# Patient Record
Sex: MALE | Race: BLACK OR AFRICAN AMERICAN | Employment: FULL TIME | URBAN - METROPOLITAN AREA
[De-identification: names, ages, dates, MRNs, and addresses within clinical notes are randomized per-mention and may not be internally consistent; named-entity substitution may affect disease eponyms.]

---

## 2017-09-27 ENCOUNTER — OFFICE VISIT (OUTPATIENT)
Dept: FAMILY MEDICINE CLINIC | Facility: CLINIC | Age: 42
End: 2017-09-27

## 2017-09-27 ENCOUNTER — APPOINTMENT (OUTPATIENT)
Dept: LAB | Age: 42
End: 2017-09-27
Attending: FAMILY MEDICINE
Payer: COMMERCIAL

## 2017-09-27 VITALS
HEART RATE: 101 BPM | WEIGHT: 270 LBS | HEIGHT: 75 IN | DIASTOLIC BLOOD PRESSURE: 94 MMHG | BODY MASS INDEX: 33.57 KG/M2 | SYSTOLIC BLOOD PRESSURE: 144 MMHG | TEMPERATURE: 98 F

## 2017-09-27 DIAGNOSIS — Z00.00 WELL ADULT EXAM: Primary | ICD-10-CM

## 2017-09-27 DIAGNOSIS — IMO0001 UNCONTROLLED TYPE 2 DIABETES MELLITUS WITHOUT COMPLICATION, WITH LONG-TERM CURRENT USE OF INSULIN: ICD-10-CM

## 2017-09-27 DIAGNOSIS — E66.9 OBESITY (BMI 30.0-34.9): ICD-10-CM

## 2017-09-27 DIAGNOSIS — B35.3 TINEA PEDIS OF BOTH FEET: ICD-10-CM

## 2017-09-27 DIAGNOSIS — N52.8 OTHER MALE ERECTILE DYSFUNCTION: ICD-10-CM

## 2017-09-27 DIAGNOSIS — I10 ESSENTIAL HYPERTENSION: ICD-10-CM

## 2017-09-27 DIAGNOSIS — Z23 NEED FOR INFLUENZA VACCINATION: ICD-10-CM

## 2017-09-27 PROCEDURE — 90471 IMMUNIZATION ADMIN: CPT | Performed by: FAMILY MEDICINE

## 2017-09-27 PROCEDURE — 93010 ELECTROCARDIOGRAM REPORT: CPT | Performed by: FAMILY MEDICINE

## 2017-09-27 PROCEDURE — 90686 IIV4 VACC NO PRSV 0.5 ML IM: CPT | Performed by: FAMILY MEDICINE

## 2017-09-27 PROCEDURE — 93005 ELECTROCARDIOGRAM TRACING: CPT

## 2017-09-27 PROCEDURE — 99396 PREV VISIT EST AGE 40-64: CPT | Performed by: FAMILY MEDICINE

## 2017-09-27 RX ORDER — LISINOPRIL 10 MG/1
10 TABLET ORAL DAILY
Qty: 90 TABLET | Refills: 0 | Status: SHIPPED | OUTPATIENT
Start: 2017-09-27

## 2017-09-27 RX ORDER — CLOTRIMAZOLE 1 %
1 CREAM (GRAM) TOPICAL 2 TIMES DAILY
Qty: 1 TUBE | Refills: 0 | Status: ON HOLD | OUTPATIENT
Start: 2017-09-27 | End: 2020-06-30 | Stop reason: ALTCHOICE
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
## 2017-09-27 NOTE — PATIENT INSTRUCTIONS
Athlete’s Foot    Athlete’s foot (tinea pedis) is caused by a fungal infection in the skin. It affects the skin between the toes, causing cracks in the skin called fissures.  It can also affect the bottom of the foot where it causes dry white scales and p · Increasing redness or swelling of the foot  · Infection comes back soon after treatment  · Pus draining from cracks in the skin  Date Last Reviewed: 8/1/2016  © 5997-9921 The [address] [place] [address], [address] [place] [address] [place].  All rights re

## 2017-09-28 ENCOUNTER — TELEPHONE (OUTPATIENT)
Dept: FAMILY MEDICINE CLINIC | Facility: CLINIC | Age: 42
End: 2017-09-28

## 2017-09-28 NOTE — TELEPHONE ENCOUNTER
----- Message from Kala Tinsley MD sent at 9/28/2017 12:45 PM CDT -----  EKG WAS NORMAL  PLS INFORM PATIENT

## 2019-01-12 ENCOUNTER — APPOINTMENT (OUTPATIENT)
Dept: GENERAL RADIOLOGY | Facility: HOSPITAL | Age: 44
DRG: 638 | End: 2019-01-12
Attending: EMERGENCY MEDICINE
Payer: COMMERCIAL

## 2019-01-12 ENCOUNTER — HOSPITAL ENCOUNTER (INPATIENT)
Facility: HOSPITAL | Age: 44
LOS: 3 days | Discharge: HOME HEALTH | DRG: 638 | End: 2019-01-15
Attending: EMERGENCY MEDICINE | Admitting: HOSPITALIST
Payer: COMMERCIAL

## 2019-01-12 DIAGNOSIS — L03.119 CELLULITIS IN DIABETIC FOOT (HCC): Primary | ICD-10-CM

## 2019-01-12 DIAGNOSIS — L97.929 NON-HEALING ULCER OF LOWER EXTREMITY, LEFT, WITH UNSPECIFIED SEVERITY (HCC): ICD-10-CM

## 2019-01-12 DIAGNOSIS — E11.628 CELLULITIS IN DIABETIC FOOT (HCC): Primary | ICD-10-CM

## 2019-01-12 LAB
ACETONE SERPL QL: NEGATIVE
ANION GAP SERPL CALC-SCNC: 10 MMOL/L (ref 0–18)
BACTERIA UR QL AUTO: NEGATIVE /HPF
BASOPHILS # BLD: 0 K/UL (ref 0–0.2)
BASOPHILS NFR BLD: 1 %
BILIRUB UR QL: NEGATIVE
BUN SERPL-MCNC: 8 MG/DL (ref 8–20)
BUN/CREAT SERPL: 8.2 (ref 10–20)
CALCIUM SERPL-MCNC: 9.3 MG/DL (ref 8.5–10.5)
CHLORIDE SERPL-SCNC: 101 MMOL/L (ref 95–110)
CLARITY UR: CLEAR
CO2 SERPL-SCNC: 27 MMOL/L (ref 22–32)
COLOR UR: YELLOW
CREAT SERPL-MCNC: 0.97 MG/DL (ref 0.5–1.5)
EOSINOPHIL # BLD: 0.3 K/UL (ref 0–0.7)
EOSINOPHIL NFR BLD: 5 %
ERYTHROCYTE [DISTWIDTH] IN BLOOD BY AUTOMATED COUNT: 13 % (ref 11–15)
EST. AVERAGE GLUCOSE BLD GHB EST-MCNC: 151 MG/DL (ref 68–126)
GLUCOSE BLDC GLUCOMTR-MCNC: 153 MG/DL (ref 70–99)
GLUCOSE BLDC GLUCOMTR-MCNC: 157 MG/DL (ref 70–99)
GLUCOSE BLDC GLUCOMTR-MCNC: 210 MG/DL (ref 70–99)
GLUCOSE BLDC GLUCOMTR-MCNC: 227 MG/DL (ref 70–99)
GLUCOSE BLDC GLUCOMTR-MCNC: 69 MG/DL (ref 70–99)
GLUCOSE BLDC GLUCOMTR-MCNC: 88 MG/DL (ref 70–99)
GLUCOSE SERPL-MCNC: 210 MG/DL (ref 70–99)
GLUCOSE UR-MCNC: 50 MG/DL
HBA1C MFR BLD HPLC: 6.9 % (ref ?–5.7)
HCT VFR BLD AUTO: 42 % (ref 41–52)
HGB BLD-MCNC: 14.7 G/DL (ref 13.5–17.5)
HGB UR QL STRIP.AUTO: NEGATIVE
KETONES UR-MCNC: NEGATIVE MG/DL
LEUKOCYTE ESTERASE UR QL STRIP.AUTO: NEGATIVE
LYMPHOCYTES # BLD: 2 K/UL (ref 1–4)
LYMPHOCYTES NFR BLD: 37 %
MCH RBC QN AUTO: 29.4 PG (ref 27–32)
MCHC RBC AUTO-ENTMCNC: 34.9 G/DL (ref 32–37)
MCV RBC AUTO: 84.2 FL (ref 80–100)
MONOCYTES # BLD: 0.7 K/UL (ref 0–1)
MONOCYTES NFR BLD: 13 %
NEUTROPHILS # BLD AUTO: 2.5 K/UL (ref 1.8–7.7)
NEUTROPHILS NFR BLD: 45 %
NITRITE UR QL STRIP.AUTO: NEGATIVE
OSMOLALITY UR CALC.SUM OF ELEC: 291 MOSM/KG (ref 275–295)
PH UR: 6 [PH] (ref 5–8)
PLATELET # BLD AUTO: 218 K/UL (ref 140–400)
PMV BLD AUTO: 8 FL (ref 7.4–10.3)
POTASSIUM SERPL-SCNC: 3.2 MMOL/L (ref 3.3–5.1)
POTASSIUM SERPL-SCNC: 4 MMOL/L (ref 3.3–5.1)
PROT UR-MCNC: NEGATIVE MG/DL
RBC # BLD AUTO: 4.98 M/UL (ref 4.5–5.9)
RBC #/AREA URNS AUTO: 1 /HPF
SODIUM SERPL-SCNC: 138 MMOL/L (ref 136–144)
SP GR UR STRIP: 1.02 (ref 1–1.03)
UROBILINOGEN UR STRIP-ACNC: <2
VIT C UR-MCNC: NEGATIVE MG/DL
WBC # BLD AUTO: 5.5 K/UL (ref 4–11)
WBC #/AREA URNS AUTO: 1 /HPF

## 2019-01-12 PROCEDURE — 73630 X-RAY EXAM OF FOOT: CPT | Performed by: EMERGENCY MEDICINE

## 2019-01-12 PROCEDURE — 99223 1ST HOSP IP/OBS HIGH 75: CPT | Performed by: HOSPITALIST

## 2019-01-12 RX ORDER — ACETAMINOPHEN 325 MG/1
650 TABLET ORAL EVERY 4 HOURS PRN
Status: DISCONTINUED | OUTPATIENT
Start: 2019-01-12 | End: 2019-01-15

## 2019-01-12 RX ORDER — LISINOPRIL 10 MG/1
10 TABLET ORAL DAILY
Status: DISCONTINUED | OUTPATIENT
Start: 2019-01-12 | End: 2019-01-15

## 2019-01-12 RX ORDER — HEPARIN SODIUM 5000 [USP'U]/ML
5000 INJECTION, SOLUTION INTRAVENOUS; SUBCUTANEOUS EVERY 8 HOURS SCHEDULED
Status: DISCONTINUED | OUTPATIENT
Start: 2019-01-12 | End: 2019-01-15

## 2019-01-12 RX ORDER — ONDANSETRON 2 MG/ML
4 INJECTION INTRAMUSCULAR; INTRAVENOUS EVERY 6 HOURS PRN
Status: DISCONTINUED | OUTPATIENT
Start: 2019-01-12 | End: 2019-01-15

## 2019-01-12 RX ORDER — SODIUM CHLORIDE 9 MG/ML
INJECTION, SOLUTION INTRAVENOUS CONTINUOUS
Status: DISCONTINUED | OUTPATIENT
Start: 2019-01-12 | End: 2019-01-14

## 2019-01-12 RX ORDER — METOCLOPRAMIDE HYDROCHLORIDE 5 MG/ML
10 INJECTION INTRAMUSCULAR; INTRAVENOUS EVERY 8 HOURS PRN
Status: DISCONTINUED | OUTPATIENT
Start: 2019-01-12 | End: 2019-01-15

## 2019-01-12 RX ORDER — ZOLPIDEM TARTRATE 5 MG/1
5 TABLET ORAL NIGHTLY PRN
Status: DISCONTINUED | OUTPATIENT
Start: 2019-01-12 | End: 2019-01-15

## 2019-01-12 RX ORDER — POTASSIUM CHLORIDE 20 MEQ/1
40 TABLET, EXTENDED RELEASE ORAL EVERY 4 HOURS
Status: COMPLETED | OUTPATIENT
Start: 2019-01-12 | End: 2019-01-12

## 2019-01-12 RX ORDER — HYDROCODONE BITARTRATE AND ACETAMINOPHEN 5; 325 MG/1; MG/1
2 TABLET ORAL EVERY 4 HOURS PRN
Status: DISCONTINUED | OUTPATIENT
Start: 2019-01-12 | End: 2019-01-15

## 2019-01-12 RX ORDER — HYDROCODONE BITARTRATE AND ACETAMINOPHEN 5; 325 MG/1; MG/1
1 TABLET ORAL EVERY 4 HOURS PRN
Status: DISCONTINUED | OUTPATIENT
Start: 2019-01-12 | End: 2019-01-15

## 2019-01-12 RX ORDER — DEXTROSE MONOHYDRATE 25 G/50ML
50 INJECTION, SOLUTION INTRAVENOUS AS NEEDED
Status: DISCONTINUED | OUTPATIENT
Start: 2019-01-12 | End: 2019-01-15

## 2019-01-12 RX ORDER — SODIUM CHLORIDE 0.9 % (FLUSH) 0.9 %
3 SYRINGE (ML) INJECTION AS NEEDED
Status: DISCONTINUED | OUTPATIENT
Start: 2019-01-12 | End: 2019-01-15

## 2019-01-12 RX ORDER — ACETAMINOPHEN 325 MG/1
650 TABLET ORAL EVERY 6 HOURS PRN
Status: DISCONTINUED | OUTPATIENT
Start: 2019-01-12 | End: 2019-01-15

## 2019-01-12 RX ORDER — LORAZEPAM 2 MG/ML
0.5 INJECTION INTRAMUSCULAR EVERY 6 HOURS PRN
Status: DISCONTINUED | OUTPATIENT
Start: 2019-01-12 | End: 2019-01-15

## 2019-01-12 RX ORDER — SODIUM PHOSPHATE, DIBASIC AND SODIUM PHOSPHATE, MONOBASIC 7; 19 G/133ML; G/133ML
1 ENEMA RECTAL ONCE AS NEEDED
Status: DISCONTINUED | OUTPATIENT
Start: 2019-01-12 | End: 2019-01-15

## 2019-01-12 RX ORDER — POLYETHYLENE GLYCOL 3350 17 G/17G
17 POWDER, FOR SOLUTION ORAL DAILY PRN
Status: DISCONTINUED | OUTPATIENT
Start: 2019-01-12 | End: 2019-01-15

## 2019-01-12 RX ORDER — DOCUSATE SODIUM 100 MG/1
100 CAPSULE, LIQUID FILLED ORAL 2 TIMES DAILY
Status: DISCONTINUED | OUTPATIENT
Start: 2019-01-12 | End: 2019-01-15

## 2019-01-12 RX ORDER — BISACODYL 10 MG
10 SUPPOSITORY, RECTAL RECTAL
Status: DISCONTINUED | OUTPATIENT
Start: 2019-01-12 | End: 2019-01-15

## 2019-01-12 NOTE — CONSULTS
[facility]    Report of Consultation    Candance Sport Patient Status:  Inpatient    1975 MRN [id number]   Location [facility] 1W Attending Karina Brice   Hosp Day # 0 PCP Ling Harris MD     Date of Admissio mL, Intravenous, PRN  •  0.9%  NaCl infusion, , Intravenous, Continuous  •  Heparin Sodium (Porcine) 5000 UNIT/ML injection 5,000 Units, 5,000 Units, Subcutaneous, Q8H [address]  •  acetaminophen (TYLENOL) tab 650 mg, 650 mg, Oral, Q6H PRN  •  acetaminophen (TYL cellulitis and swelling. His hallux nails are absent bilateral.  Other toenails are thickened dystrophic he says they fall off from time to time to.   There is an ulceration at the plantar distal tip of the left hallux there is erythema edema peeling of th erythema and edema of the toe will rule out osteomyelitis with an MRI with and without contrast.  The patient does not require vascular studies he has good capillary return and perfusion to the toes and in addition to that very easily palpable pulses both
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
## 2019-01-12 NOTE — ED NOTES
44yo M arrives to ER with c/o left big toe swelling and drainage. Patient also c/o swelling to left ankle and foot. Denies fevers. Patient with large wound to R toe. Patient states he has sensation to foot and toe. Denies pain.  States he noticed wound and

## 2019-01-12 NOTE — ED PROVIDER NOTES
Xr Foot, Complete (min 3 Views), Left (cpt=73630)    Result Date: 1/12/2019  CONCLUSION:  1. No fracture or destructive process. 2. Mild degenerative changes at the first metatarsal head. 3. Minimal posterior calcaneal spur.  4. Soft swelling first digit wi

## 2019-01-12 NOTE — ED PROVIDER NOTES
Patient Seen in: [facility] AND [facility] Emergency Department    History   Patient presents with:   Ankle Pain    Stated Complaint: left ankle swollen    HPI    38 yo male with h/o diabetes presents with one week of increased pain, swelling to  The right great sounds normal.   Abdominal: Soft. Bowel sounds are normal. He exhibits no distension and no mass. There is no tenderness. There is no rebound and no guarding. Musculoskeletal:   Left foot: the great toe is tender and swollen extending to the midfoot.  The

## 2019-01-13 ENCOUNTER — APPOINTMENT (OUTPATIENT)
Dept: MRI IMAGING | Facility: HOSPITAL | Age: 44
DRG: 638 | End: 2019-01-13
Attending: PODIATRIST
Payer: COMMERCIAL

## 2019-01-13 LAB
ALBUMIN SERPL BCP-MCNC: 3.2 G/DL (ref 3.5–4.8)
ALBUMIN/GLOB SERPL: 1 {RATIO} (ref 1–2)
ALP SERPL-CCNC: 62 U/L (ref 32–100)
ALT SERPL-CCNC: 15 U/L (ref 17–63)
ANION GAP SERPL CALC-SCNC: 8 MMOL/L (ref 0–18)
AST SERPL-CCNC: 15 U/L (ref 15–41)
BASOPHILS # BLD: 0 K/UL (ref 0–0.2)
BASOPHILS NFR BLD: 1 %
BILIRUB SERPL-MCNC: 0.5 MG/DL (ref 0.3–1.2)
BUN SERPL-MCNC: 6 MG/DL (ref 8–20)
BUN/CREAT SERPL: 5.6 (ref 10–20)
CALCIUM SERPL-MCNC: 9.1 MG/DL (ref 8.5–10.5)
CHLORIDE SERPL-SCNC: 106 MMOL/L (ref 95–110)
CO2 SERPL-SCNC: 25 MMOL/L (ref 22–32)
CREAT SERPL-MCNC: 1.08 MG/DL (ref 0.5–1.5)
EOSINOPHIL # BLD: 0.3 K/UL (ref 0–0.7)
EOSINOPHIL NFR BLD: 4 %
ERYTHROCYTE [DISTWIDTH] IN BLOOD BY AUTOMATED COUNT: 13.1 % (ref 11–15)
GLOBULIN PLAS-MCNC: 3.3 G/DL (ref 2.5–3.7)
GLUCOSE BLDC GLUCOMTR-MCNC: 138 MG/DL (ref 70–99)
GLUCOSE BLDC GLUCOMTR-MCNC: 155 MG/DL (ref 70–99)
GLUCOSE BLDC GLUCOMTR-MCNC: 166 MG/DL (ref 70–99)
GLUCOSE BLDC GLUCOMTR-MCNC: 181 MG/DL (ref 70–99)
GLUCOSE SERPL-MCNC: 154 MG/DL (ref 70–99)
HCT VFR BLD AUTO: 38.2 % (ref 41–52)
HGB BLD-MCNC: 12.9 G/DL (ref 13.5–17.5)
LYMPHOCYTES # BLD: 2.6 K/UL (ref 1–4)
LYMPHOCYTES NFR BLD: 40 %
MAGNESIUM SERPL-MCNC: 1.7 MG/DL (ref 1.8–2.5)
MCH RBC QN AUTO: 28.4 PG (ref 27–32)
MCHC RBC AUTO-ENTMCNC: 33.7 G/DL (ref 32–37)
MCV RBC AUTO: 84.4 FL (ref 80–100)
MONOCYTES # BLD: 0.6 K/UL (ref 0–1)
MONOCYTES NFR BLD: 8 %
NEUTROPHILS # BLD AUTO: 3.1 K/UL (ref 1.8–7.7)
NEUTROPHILS NFR BLD: 47 %
OSMOLALITY UR CALC.SUM OF ELEC: 289 MOSM/KG (ref 275–295)
PLATELET # BLD AUTO: 212 K/UL (ref 140–400)
PMV BLD AUTO: 8 FL (ref 7.4–10.3)
POTASSIUM SERPL-SCNC: 4 MMOL/L (ref 3.3–5.1)
PROT SERPL-MCNC: 6.5 G/DL (ref 5.9–8.4)
RBC # BLD AUTO: 4.52 M/UL (ref 4.5–5.9)
SODIUM SERPL-SCNC: 139 MMOL/L (ref 136–144)
TSH SERPL-ACNC: 2.59 UIU/ML (ref 0.45–5.33)
WBC # BLD AUTO: 6.6 K/UL (ref 4–11)

## 2019-01-13 PROCEDURE — 73720 MRI LWR EXTREMITY W/O&W/DYE: CPT | Performed by: PODIATRIST

## 2019-01-13 PROCEDURE — 99233 SBSQ HOSP IP/OBS HIGH 50: CPT | Performed by: HOSPITALIST

## 2019-01-13 RX ORDER — MAGNESIUM OXIDE 400 MG (241.3 MG MAGNESIUM) TABLET
400 TABLET ONCE
Status: COMPLETED | OUTPATIENT
Start: 2019-01-13 | End: 2019-01-13

## 2019-01-13 NOTE — PROGRESS NOTES
[facility] Pharmacy Note:  Antibiotic Dose Adjustment    Zosyn (piperacillin/tazobactam) 3.375g IV q8h was ordered for Melba London by Dr. Phoebe Perez. Body mass index is 35.86 kg/m².   Wt Readings from Last 6 Encounters:  01/12/19 : 126.7 kg (279 lb 4.8 oz)  0

## 2019-01-13 NOTE — PROGRESS NOTES
[facility]    Progress Note    Shauna Grady Patient Status:  Inpatient    1975 MRN [id number]   Location [facility] 5SW/SE Attending Mliagros Lee MD   Hosp Day # 1 PCP Ling Harris MD       SUBJECTIVE:    Feeling calcaneal spur. 4. Soft swelling first digit with additional soft tissue at the dorsum of the foot.  No radiopaque foreign bodies     Dictated by (CST): Corona Bond MD on 1/12/2019 at 7:03     Approved by (CST): Corona Bond MD on 1/12/2019 at 7:08 PRN   FLEET ENEMA (FLEET) 7-19 GM/118ML enema 133 mL 1 enema Rectal Once PRN   dextrose 50 % injection 50 mL 50 mL Intravenous PRN   Glucose-Vitamin C (DEX-4) 4-6 GM-MG chewable tab 4 tablet 4 tablet Oral Q15 Min PRN   glucose (DEX4) oral liquid 15 g 15 g

## 2019-01-13 NOTE — PLAN OF CARE
Problem: Patient Centered Care  Goal: Patient preferences are identified and integrated in the patient's plan of care  Interventions:  - What would you like us to know as we care for you? LIVES AT HOME WITH WIFE AND FAMILY.  PATIENT IS DIABETIC  - Provide t non-pharmacological measures as appropriate and evaluate response  - Consider cultural and social influences on pain and pain management  - Manage/alleviate anxiety  - Utilize distraction and/or relaxation techniques  - Monitor for opioid side effects  - N

## 2019-01-14 ENCOUNTER — APPOINTMENT (OUTPATIENT)
Dept: PICC SERVICES | Facility: HOSPITAL | Age: 44
DRG: 638 | End: 2019-01-14
Attending: INTERNAL MEDICINE
Payer: COMMERCIAL

## 2019-01-14 LAB
ANION GAP SERPL CALC-SCNC: 11 MMOL/L (ref 0–18)
BASOPHILS # BLD: 0 K/UL (ref 0–0.2)
BASOPHILS NFR BLD: 1 %
BUN SERPL-MCNC: 7 MG/DL (ref 8–20)
BUN/CREAT SERPL: 6.1 (ref 10–20)
CALCIUM SERPL-MCNC: 9.2 MG/DL (ref 8.5–10.5)
CHLORIDE SERPL-SCNC: 103 MMOL/L (ref 95–110)
CO2 SERPL-SCNC: 23 MMOL/L (ref 22–32)
CREAT SERPL-MCNC: 1.14 MG/DL (ref 0.5–1.5)
EOSINOPHIL # BLD: 0.2 K/UL (ref 0–0.7)
EOSINOPHIL NFR BLD: 3 %
ERYTHROCYTE [DISTWIDTH] IN BLOOD BY AUTOMATED COUNT: 12.9 % (ref 11–15)
GLUCOSE BLDC GLUCOMTR-MCNC: 127 MG/DL (ref 70–99)
GLUCOSE BLDC GLUCOMTR-MCNC: 146 MG/DL (ref 70–99)
GLUCOSE BLDC GLUCOMTR-MCNC: 146 MG/DL (ref 70–99)
GLUCOSE BLDC GLUCOMTR-MCNC: 148 MG/DL (ref 70–99)
GLUCOSE SERPL-MCNC: 161 MG/DL (ref 70–99)
HCT VFR BLD AUTO: 39.5 % (ref 41–52)
HGB BLD-MCNC: 13.6 G/DL (ref 13.5–17.5)
LYMPHOCYTES # BLD: 2.1 K/UL (ref 1–4)
LYMPHOCYTES NFR BLD: 31 %
MAGNESIUM SERPL-MCNC: 1.8 MG/DL (ref 1.8–2.5)
MCH RBC QN AUTO: 29.3 PG (ref 27–32)
MCHC RBC AUTO-ENTMCNC: 34.5 G/DL (ref 32–37)
MCV RBC AUTO: 85 FL (ref 80–100)
MONOCYTES # BLD: 0.6 K/UL (ref 0–1)
MONOCYTES NFR BLD: 9 %
NEUTROPHILS # BLD AUTO: 3.8 K/UL (ref 1.8–7.7)
NEUTROPHILS NFR BLD: 56 %
OSMOLALITY UR CALC.SUM OF ELEC: 285 MOSM/KG (ref 275–295)
PLATELET # BLD AUTO: 231 K/UL (ref 140–400)
PMV BLD AUTO: 8.1 FL (ref 7.4–10.3)
POTASSIUM SERPL-SCNC: 4 MMOL/L (ref 3.3–5.1)
RBC # BLD AUTO: 4.64 M/UL (ref 4.5–5.9)
SODIUM SERPL-SCNC: 137 MMOL/L (ref 136–144)
WBC # BLD AUTO: 6.7 K/UL (ref 4–11)

## 2019-01-14 PROCEDURE — 99233 SBSQ HOSP IP/OBS HIGH 50: CPT | Performed by: HOSPITALIST

## 2019-01-14 PROCEDURE — 02HV33Z INSERTION OF INFUSION DEVICE INTO SUPERIOR VENA CAVA, PERCUTANEOUS APPROACH: ICD-10-PCS | Performed by: INTERNAL MEDICINE

## 2019-01-14 RX ORDER — HYDROCHLOROTHIAZIDE 25 MG/1
25 TABLET ORAL ONCE
Status: COMPLETED | OUTPATIENT
Start: 2019-01-14 | End: 2019-01-14

## 2019-01-14 RX ORDER — LIDOCAINE HYDROCHLORIDE 10 MG/ML
0.5 INJECTION, SOLUTION INFILTRATION; PERINEURAL ONCE AS NEEDED
Status: ACTIVE | OUTPATIENT
Start: 2019-01-14 | End: 2019-01-14

## 2019-01-14 RX ORDER — SODIUM CHLORIDE 0.9 % (FLUSH) 0.9 %
10 SYRINGE (ML) INJECTION AS NEEDED
Status: DISCONTINUED | OUTPATIENT
Start: 2019-01-14 | End: 2019-01-15

## 2019-01-14 RX ORDER — MAGNESIUM OXIDE 400 MG (241.3 MG MAGNESIUM) TABLET
400 TABLET ONCE
Status: COMPLETED | OUTPATIENT
Start: 2019-01-14 | End: 2019-01-14

## 2019-01-14 NOTE — PROGRESS NOTES
[facility] dosing service    Initial Pharmacokinetic Consult for Vancomycin Dosing     Candance Sport is a 37year old male admitted on 1/12 who is being treated for cellulitis and osteomyelitis.   Pharmacy has been asked to dose Vancomycin by Dr. Navdeep Grijalva receive a loading dose of Vancomycin  2 gm IVPB (25mg/kg, capped at 2g) x 1 dose, followed by 1.75 gm Q 12 hours  based upon, adjusted weight, renal function, and pharmacokinetics. 2.  Pharmacy ordered Vancomycin trough level(s) prior to 4th dose.    [facility]

## 2019-01-14 NOTE — PROGRESS NOTES
[facility]    Progress Note    Denisha Smith Patient Status:  Inpatient    1975 MRN [id number]   Location [facility] 5SW/SE Attending Cresencio Mcneill MD   Hosp Day # 2 PCP Ling Harris MD     History:  Past Medical Hist (MIRALAX) powder packet 17 g, 17 g, Oral, Daily PRN  •  magnesium hydroxide (MILK OF MAGNESIA) 400 MG/5ML suspension 30 mL, 30 mL, Oral, Daily PRN  •  bisacodyl (DULCOLAX) rectal suppository 10 mg, 10 mg, Rectal, Daily PRN  •  FLEET ENEMA (FLEET) 7-19 GM/1 associated x-ray changes; findings consistent with mild/early osteomyelitis.  3. Small soft tissue phlegmon at the plantar aspect of the foot at the level of the fifth metatarsal head     Dictated by (CST): Diana Zavaleta MD on 1/13/2019 at 13:12     Appr

## 2019-01-14 NOTE — CONSULTS
INFECTIOUS DISEASE CONSULT NOTE    Crissy Agee Patient Status:  Inpatient    1975 MRN [id number]   Location [facility] 5SW/SE Attending Beltran Aguirre MD   Hosp Day # 2 PCP Ling Sod-Tazobactam So (ZOSYN) 4.5 g in dextrose 5 % 100 mL ADD-vantage, 4.5 g, Intravenous, Q8H  •  lisinopril (PRINIVIL,ZESTRIL) tab 10 mg, 10 mg, Oral, Daily  •  Normal Saline Flush 0.9 % injection 3 mL, 3 mL, Intravenous, PRN  •  0.9%  NaCl infusion, , Intr throat. SKIN:  No rash or itching. CARDIOVASCULAR:  No chest pain, chest pressure or chest discomfort  RESPIRATORY:  No shortness of breath, +cough or sputum. GASTROINTESTINAL:  No anorexia, nausea, vomiting or diarrhea. No abdominal pain or blood.   GEN vancomycin, Unsyn 1/14; (IV Zosyn)      ASSESSMENT:  77-year-old male with a history of diabetes A1c 6.9, HTN presents the hospital on 1/12 with complaints of left ankle, foot, toe swelling with pain and noted to have a wound at the bottom of the L hallux.

## 2019-01-14 NOTE — DIABETES ED
[facility]    Diabetes Education  Note    Nadege Pair Patient Status:  Inpatient   1975 MRN [id number]  Location [facility] 5SW/SE Attending Rell June MD  Hosp Day # 2 PCP Ling Harris MD    Reason for Visit: Fab Ivan

## 2019-01-14 NOTE — CM/SW NOTE
Pt will likely be needing 6wks abx. SW met w/ pt to discuss. Pt agreeable to do abx at home. Pt stated he feels comfortable to do abx infusion at home. Pt stated his wife and 24 y/o son will also be available to assist, if needed.      MANDA sent referral to Katie Mora

## 2019-01-14 NOTE — PROGRESS NOTES
Vascular Access Note  Inserted by Belinda Cantu RN    Vascular Access Screening:   Allergies to Lidocaine: no  Allergies to Latex: no  Presence of Pacemaker/Defibrillator: No  Mastectomy with Lymph Node Dissection: No  AV Fistula / AV Graft: No  Dialysis Ca

## 2019-01-15 VITALS
OXYGEN SATURATION: 98 % | WEIGHT: 279.31 LBS | TEMPERATURE: 98 F | DIASTOLIC BLOOD PRESSURE: 98 MMHG | HEART RATE: 92 BPM | RESPIRATION RATE: 18 BRPM | SYSTOLIC BLOOD PRESSURE: 163 MMHG | HEIGHT: 74 IN | BODY MASS INDEX: 35.84 KG/M2

## 2019-01-15 LAB
CRP SERPL-MCNC: 1.4 MG/DL (ref 0–0.9)
ERYTHROCYTE [SEDIMENTATION RATE] IN BLOOD: 38 MM/HR (ref 0–15)
GLUCOSE BLDC GLUCOMTR-MCNC: 138 MG/DL (ref 70–99)
GLUCOSE BLDC GLUCOMTR-MCNC: 166 MG/DL (ref 70–99)
MAGNESIUM SERPL-MCNC: 1.9 MG/DL (ref 1.8–2.5)

## 2019-01-15 PROCEDURE — 99239 HOSP IP/OBS DSCHRG MGMT >30: CPT | Performed by: HOSPITALIST

## 2019-01-15 RX ORDER — HYDROCHLOROTHIAZIDE 25 MG/1
25 TABLET ORAL DAILY
Status: DISCONTINUED | OUTPATIENT
Start: 2019-01-15 | End: 2019-01-15

## 2019-01-15 RX ORDER — HYDROCHLOROTHIAZIDE 25 MG/1
25 TABLET ORAL DAILY
Qty: 30 TABLET | Refills: 0 | Status: ON HOLD | OUTPATIENT
Start: 2019-01-16 | End: 2020-07-07

## 2019-01-15 NOTE — PROGRESS NOTES
INFECTIOUS DISEASE PROGRESS NOTE    Meaghan Brower Patient Status:  Inpatient    1975 MRN [id number]   Location [facility] 5SW/SE Attending Jeannette Leach MD   Hosp Day # 3 PCP Ling Harris MD     Subjective:  Afebrile. Tolerating abx. afebrile with no leukocytosis. Given IV vancomycin and Zosyn is continued on Zosyn.   MRI of the left foot with diffuse edema of the forefoot consistent with cellulitis with bone marrow edema of the left first distal phalanx without cortical destruction co
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
## 2019-01-15 NOTE — PLAN OF CARE
Problem: Patient Centered Care  Goal: Patient preferences are identified and integrated in the patient's plan of care  Interventions:  - What would you like us to know as we care for you? LIVES AT HOME WITH WIFE AND FAMILY.  PATIENT IS DIABETIC  - Provide t of pain and evaluate response  - Implement non-pharmacological measures as appropriate and evaluate response  - Consider cultural and social influences on pain and pain management  - Manage/alleviate anxiety  - Utilize distraction and/or relaxation techniq

## 2019-01-15 NOTE — CM/SW NOTE
[facility]. [facility] to deliver supplies tomorrow, 1/16. RN aware.     Allegra Caballero, [address]

## 2019-01-16 ENCOUNTER — PATIENT OUTREACH (OUTPATIENT)
Dept: CASE MANAGEMENT | Age: 44
End: 2019-01-16

## 2019-01-16 DIAGNOSIS — E11.628 CELLULITIS IN DIABETIC FOOT (HCC): ICD-10-CM

## 2019-01-16 DIAGNOSIS — Z02.9 ENCOUNTERS FOR ADMINISTRATIVE PURPOSE: ICD-10-CM

## 2019-01-16 DIAGNOSIS — L03.119 CELLULITIS IN DIABETIC FOOT (HCC): ICD-10-CM

## 2019-01-16 PROCEDURE — 1111F DSCHRG MED/CURRENT MED MERGE: CPT

## 2019-01-16 NOTE — PROGRESS NOTES
Initial Post Discharge Follow Up   Discharge Date: 1/15/19  Contact Date: 1/16/2019    Consent Verification:  Assessment Completed With: Patient  HIPAA Verified?   Yes    Discharge Dx:  Cellulitis in diabetic foot    General:   • How have you been since y any questions about your new medication? No  • Did you  your discharge medications when you left the hospital? Yes  • May I go over your medications with you to make sure we are not missing anything? yes  • Are there any reasons that keep you from ta outpatient medications with patient,  and orders reviewed and discussed. Any changes or updates to medications and or orders sent to PCP.

## 2019-01-16 NOTE — TELEPHONE ENCOUNTER
Patient dc'd from [address] [place] [address] 1/15/2019, Diabetic foot ulcer. Patient requesting refill for Insulin Lispro and Insulin Glargine. Please advise.

## 2019-01-17 ENCOUNTER — TELEPHONE (OUTPATIENT)
Dept: OTHER | Age: 44
End: 2019-01-17

## 2019-01-17 RX ORDER — INSULIN LISPRO 100 [IU]/ML
INJECTION, SOLUTION INTRAVENOUS; SUBCUTANEOUS
Qty: 10 ML | Refills: 0 | Status: ON HOLD | OUTPATIENT
Start: 2019-01-17 | End: 2020-07-07

## 2019-01-17 NOTE — TELEPHONE ENCOUNTER
Pt contacted. Informed him of Dr. Sky Gongora message. Pt verbalized understanding  Clarified insulin dose with Pt. States taking long acting insulin 20 units at night. Pt taking short acting insulin 3 times a day with sliding scale.  Pt reports sliding sca

## 2019-01-17 NOTE — TELEPHONE ENCOUNTER
Please dispense 1 months of his insulin as per his discharge papers from his recent hospitalization at [facility]  so his diabetes remains well controlled.   He needs to see an endocrinologist and would recommend that he see one soon so he can establish care

## 2019-01-17 NOTE — TELEPHONE ENCOUNTER
Please review; protocol failed.   CSS please assist patient in scheduling a follow up appointment - thank you       Diabetes Medications  Protocol Criteria:  · Appointment scheduled in the past 6 months or the next 3 months  · A1C < 7.5 in the past 6 months

## 2019-01-17 NOTE — TELEPHONE ENCOUNTER
Lino Zuniga [place] [facility] [place] calling asking for patient information since being discharged from the hospital.    I called the patient who stated I can talk to Lino Zuniga and give her information.     Questions were answered as if he made an appointment to see

## 2019-01-17 NOTE — TELEPHONE ENCOUNTER
Pt returned call. Name and  verified. Pt states he used to see an endocrinologist at [facility], but does not remember the name. Pt aware LOV with Dr. Deb Freitas was over a year ago. Pt advised to schedule appt with Dr. Deb Freitas prior to refills.  Pt verbalized unders

## 2019-01-17 NOTE — TELEPHONE ENCOUNTER
Spoke with patient and advised Dr Marino Gibson that he already spoke with somebody and made a FU OV, verbalized understanding regarding the FU for endocrinologist.         Please dispense 1 months of his insulin as per his discharge papers from his r

## 2019-01-21 ENCOUNTER — TELEPHONE (OUTPATIENT)
Dept: PODIATRY CLINIC | Facility: CLINIC | Age: 44
End: 2019-01-21

## 2019-01-21 NOTE — TELEPHONE ENCOUNTER
Pt calling requesting letter for work stating if he can work with restrictions. Please call pt for more details.

## 2019-01-22 NOTE — TELEPHONE ENCOUNTER
Patient may not do any heavy lifting or activities which involve pushing or pulling or lifting heavy objects over 15-20 pounds. He is on sedentary duty.

## 2019-01-22 NOTE — TELEPHONE ENCOUNTER
Call to Coy Cole. Works in a [employer]. Assembles, labels and tags. Heavy and light . Wants to know capabilities. Heavy lifting he feels he would not be able to do  How much pressure on the foot.   Please advise on letter Tasking to Dr. Annamarie Garcia

## 2019-01-23 ENCOUNTER — OFFICE VISIT (OUTPATIENT)
Dept: PODIATRY CLINIC | Facility: CLINIC | Age: 44
End: 2019-01-23
Payer: COMMERCIAL

## 2019-01-23 DIAGNOSIS — IMO0001 UNCONTROLLED TYPE 2 DIABETES MELLITUS WITHOUT COMPLICATION, WITH LONG-TERM CURRENT USE OF INSULIN: Primary | ICD-10-CM

## 2019-01-23 DIAGNOSIS — L97.929 NON-HEALING ULCER OF LOWER EXTREMITY, LEFT, WITH UNSPECIFIED SEVERITY (HCC): ICD-10-CM

## 2019-01-23 DIAGNOSIS — M86.9 OSTEOMYELITIS OF TOE (HCC): ICD-10-CM

## 2019-01-23 PROCEDURE — 99213 OFFICE O/P EST LOW 20 MIN: CPT | Performed by: PODIATRIST

## 2019-01-23 NOTE — TELEPHONE ENCOUNTER
Call back to Juan Francisco. Letter generated. No answerl Left voice message. REquested call back to review his work letter and will he  or to be faxed.

## 2019-01-27 NOTE — PROGRESS NOTES
Zoraida Bose is a 37year old male.  Patient presents with:  Diabetic Ulcer: Left big toe - hospital f/u - last [id number]=6.9 from 1/12/19 - states he has less swelling, has some drainage, no pain - this AM his [id number]        HPI:   Patient returns to the  level: Not on file    Tobacco Use      Smoking status: Current Some Day Smoker        Types: Cigars      Smokeless tobacco: Never Used      Tobacco comment: 1 cigar every other day    Substance and Sexual Activity      Alcohol use: Yes        Comment: Sandeep make sure that there is no erosive changes or worsening of the condition of the bone. Patient will remain on restricted activity follow-up again in 2 weeks    The patient indicates understanding of these issues and agrees to the plan.     Return in about 2

## 2019-02-04 ENCOUNTER — TELEPHONE (OUTPATIENT)
Dept: PODIATRY CLINIC | Facility: CLINIC | Age: 44
End: 2019-02-04

## 2019-02-05 NOTE — TELEPHONE ENCOUNTER
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 Logged for processing.  NK

## 2019-02-06 ENCOUNTER — HOSPITAL ENCOUNTER (OUTPATIENT)
Dept: GENERAL RADIOLOGY | Age: 44
Discharge: HOME | End: 2019-02-06
Attending: PODIATRIST
Payer: COMMERCIAL

## 2019-02-06 ENCOUNTER — OFFICE VISIT (OUTPATIENT)
Dept: PODIATRY CLINIC | Facility: CLINIC | Age: 44
End: 2019-02-06
Payer: COMMERCIAL

## 2019-02-06 ENCOUNTER — TELEPHONE (OUTPATIENT)
Dept: OTHER | Age: 44
End: 2019-02-06

## 2019-02-06 DIAGNOSIS — L97.929 NON-HEALING ULCER OF LOWER EXTREMITY, LEFT, WITH UNSPECIFIED SEVERITY (HCC): ICD-10-CM

## 2019-02-06 DIAGNOSIS — IMO0001 UNCONTROLLED TYPE 2 DIABETES MELLITUS WITHOUT COMPLICATION, WITH LONG-TERM CURRENT USE OF INSULIN: Primary | ICD-10-CM

## 2019-02-06 DIAGNOSIS — M86.9 OSTEOMYELITIS OF TOE (HCC): ICD-10-CM

## 2019-02-06 PROCEDURE — 99213 OFFICE O/P EST LOW 20 MIN: CPT | Performed by: PODIATRIST

## 2019-02-06 PROCEDURE — 73630 X-RAY EXAM OF FOOT: CPT | Performed by: PODIATRIST

## 2019-02-06 NOTE — TELEPHONE ENCOUNTER
Spoke with Leonor  at [facility] [place] she spoke with patient for f/u IV abx for osteomyelitis--patient seen podiatrist, Dr. Prince Love today-had x-rays done.     Charles Sargent states, \"He seems to be doing very well--I am closing his case, but

## 2019-02-06 NOTE — PROGRESS NOTES
Salvador Munoz is a 37year old male.  Patient presents with:  Diabetic Ulcer: Left big toe f/u - had x-rays today - has a little tingling sometimes - has a little drainage - this AM his [id number]        HPI:   Patient was seen today no complaints of pain t Highest education level: Not on file    Tobacco Use      Smoking status: Current Some Day Smoker        Types: Cigars      Smokeless tobacco: Never Used      Tobacco comment: 1 cigar every other day    Substance and Sexual Activity      Alcohol use:  Yes indicates understanding of these issues and agrees to the plan. Return in about 2 weeks (around 2/20/2019).     Efren Fragoso DPM  2/6/2019

## 2019-02-11 NOTE — DISCHARGE SUMMARY
[place] [facility]    Discharge Summary    Crissy Agee Patient Status:  Inpatient    1975 MRN [id number]   Location [facility] 5SW/SE Attending No att. providers found   Hosp Day # 3 PCP Ling Harris MD     Date of Admission desquamated around the great toe with pink, healthy skin underneath it. He reports fever and chills on 1 day, but otherwise he has had the ulcer for 8 days that he knows of. He is on his feet all day long at [facility], where he works.     Hospital

## 2019-02-11 NOTE — PROGRESS NOTES
[facility]    Progress Note    Lindsey Rivera Patient Status:  Inpatient    1975 MRN [id number]   Location [facility] 5SW/SE Attending John Fontana MD   Hosp Day # 3 PCP Ling Harris MD       SUBJECTIVE:    Feeling workup

## 2019-02-13 ENCOUNTER — OFFICE VISIT (OUTPATIENT)
Dept: PODIATRY CLINIC | Facility: CLINIC | Age: 44
End: 2019-02-13
Payer: COMMERCIAL

## 2019-02-13 DIAGNOSIS — M86.9 OSTEOMYELITIS OF TOE (HCC): ICD-10-CM

## 2019-02-13 DIAGNOSIS — IMO0001 UNCONTROLLED TYPE 2 DIABETES MELLITUS WITHOUT COMPLICATION, WITH LONG-TERM CURRENT USE OF INSULIN: Primary | ICD-10-CM

## 2019-02-13 DIAGNOSIS — L97.929 NON-HEALING ULCER OF LOWER EXTREMITY, LEFT, WITH UNSPECIFIED SEVERITY (HCC): ICD-10-CM

## 2019-02-13 PROCEDURE — 99213 OFFICE O/P EST LOW 20 MIN: CPT | Performed by: PODIATRIST

## 2019-02-17 NOTE — PROGRESS NOTES
Clement Gregg is a 37year old male. Patient presents with:  Diabetic Ulcer: left hallux -- Drainage is decreasing. Denies pain.  BG was 156 this AM        HPI:   Patient returns to clinic for checkup on his ulceration relates little to no drainage at t Smoking status: Current Some Day Smoker        Types: Cigars      Smokeless tobacco: Never Used      Tobacco comment: 1 cigar every other day    Substance and Sexual Activity      Alcohol use: Yes        Comment: Occasionally       Drug use: No    Social H

## 2019-02-25 ENCOUNTER — OFFICE VISIT (OUTPATIENT)
Dept: PODIATRY CLINIC | Facility: CLINIC | Age: 44
End: 2019-02-25
Payer: COMMERCIAL

## 2019-02-25 DIAGNOSIS — IMO0001 UNCONTROLLED TYPE 2 DIABETES MELLITUS WITHOUT COMPLICATION, WITH LONG-TERM CURRENT USE OF INSULIN: Primary | ICD-10-CM

## 2019-02-25 DIAGNOSIS — L97.929 NON-HEALING ULCER OF LOWER EXTREMITY, LEFT, WITH UNSPECIFIED SEVERITY (HCC): ICD-10-CM

## 2019-02-25 DIAGNOSIS — M86.9 OSTEOMYELITIS OF TOE (HCC): ICD-10-CM

## 2019-02-25 PROCEDURE — 99213 OFFICE O/P EST LOW 20 MIN: CPT | Performed by: PODIATRIST

## 2019-03-02 NOTE — PROGRESS NOTES
Yenny Juarez is a 37year old male. Patient presents with:  Diabetic Ulcer: left hallux -- Denies any pain. Taking IV abx. States drainage is getting lighter and is red. Denies any fever.          HPI:   Patient returns to clinic for checkup on his ulc children: Not on file      Years of education: Not on file      Highest education level: Not on file    Tobacco Use      Smoking status: Current Some Day Smoker        Types: Cigars      Smokeless tobacco: Never Used      Tobacco comment: 1 cigar every oth will return to the work duties she previously had without any restrictions next Monday. Note dispensed. I will see him in follow-up in 2 weeks to evaluate him and make sure nothing is getting worse.   Meantime he is aware of what the signs of infection ar

## 2020-04-24 ENCOUNTER — TELEPHONE (OUTPATIENT)
Dept: FAMILY MEDICINE CLINIC | Facility: CLINIC | Age: 45
End: 2020-04-24

## 2020-04-27 ENCOUNTER — VIRTUAL PHONE E/M (OUTPATIENT)
Dept: FAMILY MEDICINE CLINIC | Facility: CLINIC | Age: 45
End: 2020-04-27
Payer: COMMERCIAL

## 2020-04-27 DIAGNOSIS — E11.65 UNCONTROLLED TYPE 2 DIABETES MELLITUS WITH HYPERGLYCEMIA (HCC): ICD-10-CM

## 2020-04-27 DIAGNOSIS — I10 ESSENTIAL HYPERTENSION: Primary | ICD-10-CM

## 2020-04-27 PROCEDURE — 99214 OFFICE O/P EST MOD 30 MIN: CPT | Performed by: FAMILY MEDICINE

## 2020-04-27 NOTE — PROGRESS NOTES
Due to the COVID-19 emergency implementation plan, this patient's visit was converted to a telephone visit as agreed upon with the patient.     Please note that the following visit was completed using two-way, real-time interactive audio and/or video commun High blood pressure    • Type II or unspecified type diabetes mellitus without mention of complication, not stated as uncontrolled          MEDICATION LIST    Current Outpatient Medications:   •  Insulin Lispro 100 UNIT/ML Subcutaneous Solution, Use a slid mailed to him for [facility]  - LIPID PANEL  - COMP METABOLIC PANEL (14)    2. Uncontrolled type 2 diabetes mellitus with hyperglycemia ([facility] [address]  Follow-up in office in the next 2 to 3 weeks.   Will review blood work upon return to office.  - HEMOGLOBIN A1C  -

## 2020-06-30 ENCOUNTER — APPOINTMENT (OUTPATIENT)
Dept: MRI IMAGING | Facility: HOSPITAL | Age: 45
DRG: 240 | End: 2020-06-30
Attending: HOSPITALIST
Payer: COMMERCIAL

## 2020-06-30 ENCOUNTER — HOSPITAL ENCOUNTER (INPATIENT)
Facility: HOSPITAL | Age: 45
LOS: 7 days | Discharge: NURSING, REHAB OR LONG-TERM CARE | DRG: 240 | End: 2020-07-07
Attending: EMERGENCY MEDICINE | Admitting: HOSPITALIST
Payer: COMMERCIAL

## 2020-06-30 ENCOUNTER — APPOINTMENT (OUTPATIENT)
Dept: GENERAL RADIOLOGY | Facility: HOSPITAL | Age: 45
DRG: 240 | End: 2020-06-30
Attending: EMERGENCY MEDICINE
Payer: COMMERCIAL

## 2020-06-30 DIAGNOSIS — L08.9 DIABETIC FOOT INFECTION (HCC): Primary | ICD-10-CM

## 2020-06-30 DIAGNOSIS — M86.9 OSTEOMYELITIS OF LEFT FOOT, UNSPECIFIED TYPE (HCC): ICD-10-CM

## 2020-06-30 DIAGNOSIS — E11.628 DIABETIC FOOT INFECTION (HCC): Primary | ICD-10-CM

## 2020-06-30 LAB
ANION GAP SERPL CALC-SCNC: 6 MMOL/L (ref 0–18)
BASOPHILS # BLD AUTO: 0.04 X10(3) UL (ref 0–0.2)
BASOPHILS NFR BLD AUTO: 0.4 %
BUN BLD-MCNC: 13 MG/DL (ref 7–18)
BUN/CREAT SERPL: 8.7 (ref 10–20)
CALCIUM BLD-MCNC: 10 MG/DL (ref 8.5–10.1)
CHLORIDE SERPL-SCNC: 101 MMOL/L (ref 98–112)
CO2 SERPL-SCNC: 28 MMOL/L (ref 21–32)
CREAT BLD-MCNC: 1.49 MG/DL (ref 0.7–1.3)
DEPRECATED RDW RBC AUTO: 40.5 FL (ref 35.1–46.3)
EOSINOPHIL # BLD AUTO: 0.09 X10(3) UL (ref 0–0.7)
EOSINOPHIL NFR BLD AUTO: 0.9 %
ERYTHROCYTE [DISTWIDTH] IN BLOOD BY AUTOMATED COUNT: 13 % (ref 11–15)
EST. AVERAGE GLUCOSE BLD GHB EST-MCNC: 177 MG/DL (ref 68–126)
GLUCOSE BLD-MCNC: 175 MG/DL (ref 70–99)
GLUCOSE BLDC GLUCOMTR-MCNC: 138 MG/DL (ref 70–99)
GLUCOSE BLDC GLUCOMTR-MCNC: 141 MG/DL (ref 70–99)
HBA1C MFR BLD HPLC: 7.8 % (ref ?–5.7)
HCT VFR BLD AUTO: 31.7 % (ref 39–53)
HGB BLD-MCNC: 9.7 G/DL (ref 13–17.5)
IMM GRANULOCYTES # BLD AUTO: 0.04 X10(3) UL (ref 0–1)
IMM GRANULOCYTES NFR BLD: 0.4 %
LYMPHOCYTES # BLD AUTO: 1.33 X10(3) UL (ref 1–4)
LYMPHOCYTES NFR BLD AUTO: 13.6 %
MCH RBC QN AUTO: 26.3 PG (ref 26–34)
MCHC RBC AUTO-ENTMCNC: 30.6 G/DL (ref 31–37)
MCV RBC AUTO: 85.9 FL (ref 80–100)
MONOCYTES # BLD AUTO: 0.66 X10(3) UL (ref 0.1–1)
MONOCYTES NFR BLD AUTO: 6.7 %
NEUTROPHILS # BLD AUTO: 7.64 X10 (3) UL (ref 1.5–7.7)
NEUTROPHILS # BLD AUTO: 7.64 X10(3) UL (ref 1.5–7.7)
NEUTROPHILS NFR BLD AUTO: 78 %
OSMOLALITY SERPL CALC.SUM OF ELEC: 284 MOSM/KG (ref 275–295)
PLATELET # BLD AUTO: 454 10(3)UL (ref 150–450)
POTASSIUM SERPL-SCNC: 4.4 MMOL/L (ref 3.5–5.1)
RBC # BLD AUTO: 3.69 X10(6)UL (ref 4.3–5.7)
SARS-COV-2 RNA RESP QL NAA+PROBE: NOT DETECTED
SODIUM SERPL-SCNC: 135 MMOL/L (ref 136–145)
WBC # BLD AUTO: 9.8 X10(3) UL (ref 4–11)

## 2020-06-30 PROCEDURE — 99223 1ST HOSP IP/OBS HIGH 75: CPT | Performed by: HOSPITALIST

## 2020-06-30 PROCEDURE — 73630 X-RAY EXAM OF FOOT: CPT | Performed by: EMERGENCY MEDICINE

## 2020-06-30 PROCEDURE — 73720 MRI LWR EXTREMITY W/O&W/DYE: CPT | Performed by: HOSPITALIST

## 2020-06-30 RX ORDER — LISINOPRIL 10 MG/1
10 TABLET ORAL DAILY
Status: DISCONTINUED | OUTPATIENT
Start: 2020-06-30 | End: 2020-07-01

## 2020-06-30 RX ORDER — VANCOMYCIN/0.9 % SOD CHLORIDE 1.75 G/5
1750 PLASTIC BAG, INJECTION (ML) INTRAVENOUS EVERY 12 HOURS
Status: DISCONTINUED | OUTPATIENT
Start: 2020-07-01 | End: 2020-07-02

## 2020-06-30 RX ORDER — MORPHINE SULFATE 4 MG/ML
4 INJECTION, SOLUTION INTRAMUSCULAR; INTRAVENOUS EVERY 2 HOUR PRN
Status: DISCONTINUED | OUTPATIENT
Start: 2020-06-30 | End: 2020-07-02

## 2020-06-30 RX ORDER — MORPHINE SULFATE 2 MG/ML
2 INJECTION, SOLUTION INTRAMUSCULAR; INTRAVENOUS EVERY 2 HOUR PRN
Status: DISCONTINUED | OUTPATIENT
Start: 2020-06-30 | End: 2020-07-02

## 2020-06-30 RX ORDER — HYDROCODONE BITARTRATE AND ACETAMINOPHEN 5; 325 MG/1; MG/1
1 TABLET ORAL EVERY 4 HOURS PRN
Status: DISCONTINUED | OUTPATIENT
Start: 2020-06-30 | End: 2020-07-07

## 2020-06-30 RX ORDER — DEXTROSE MONOHYDRATE 25 G/50ML
50 INJECTION, SOLUTION INTRAVENOUS
Status: DISCONTINUED | OUTPATIENT
Start: 2020-06-30 | End: 2020-07-07

## 2020-06-30 RX ORDER — HYDROCHLOROTHIAZIDE 25 MG/1
25 TABLET ORAL DAILY
Status: DISCONTINUED | OUTPATIENT
Start: 2020-06-30 | End: 2020-07-01

## 2020-06-30 RX ORDER — ACETAMINOPHEN 325 MG/1
650 TABLET ORAL EVERY 6 HOURS PRN
Status: DISCONTINUED | OUTPATIENT
Start: 2020-06-30 | End: 2020-07-07

## 2020-06-30 RX ORDER — HYDROCODONE BITARTRATE AND ACETAMINOPHEN 5; 325 MG/1; MG/1
2 TABLET ORAL EVERY 4 HOURS PRN
Status: DISCONTINUED | OUTPATIENT
Start: 2020-06-30 | End: 2020-07-07

## 2020-06-30 RX ORDER — METOCLOPRAMIDE HYDROCHLORIDE 5 MG/ML
10 INJECTION INTRAMUSCULAR; INTRAVENOUS EVERY 8 HOURS PRN
Status: DISCONTINUED | OUTPATIENT
Start: 2020-06-30 | End: 2020-07-07

## 2020-06-30 RX ORDER — VANCOMYCIN 2 GRAM/500 ML IN 0.9 % SODIUM CHLORIDE INTRAVENOUS
25 ONCE
Status: COMPLETED | OUTPATIENT
Start: 2020-06-30 | End: 2020-06-30

## 2020-06-30 RX ORDER — SODIUM CHLORIDE 9 MG/ML
INJECTION, SOLUTION INTRAVENOUS CONTINUOUS
Status: DISCONTINUED | OUTPATIENT
Start: 2020-06-30 | End: 2020-07-03

## 2020-06-30 RX ORDER — MORPHINE SULFATE 2 MG/ML
1 INJECTION, SOLUTION INTRAMUSCULAR; INTRAVENOUS EVERY 2 HOUR PRN
Status: DISCONTINUED | OUTPATIENT
Start: 2020-06-30 | End: 2020-07-07

## 2020-06-30 RX ORDER — SODIUM CHLORIDE 0.9 % (FLUSH) 0.9 %
3 SYRINGE (ML) INJECTION AS NEEDED
Status: DISCONTINUED | OUTPATIENT
Start: 2020-06-30 | End: 2020-07-07

## 2020-06-30 RX ORDER — HEPARIN SODIUM 5000 [USP'U]/ML
5000 INJECTION, SOLUTION INTRAVENOUS; SUBCUTANEOUS EVERY 12 HOURS SCHEDULED
Status: DISCONTINUED | OUTPATIENT
Start: 2020-06-30 | End: 2020-07-07

## 2020-06-30 RX ORDER — ONDANSETRON 2 MG/ML
4 INJECTION INTRAMUSCULAR; INTRAVENOUS EVERY 6 HOURS PRN
Status: DISCONTINUED | OUTPATIENT
Start: 2020-06-30 | End: 2020-07-07

## 2020-06-30 RX ORDER — ACETAMINOPHEN 325 MG/1
650 TABLET ORAL EVERY 4 HOURS PRN
Status: DISCONTINUED | OUTPATIENT
Start: 2020-06-30 | End: 2020-07-07

## 2020-06-30 NOTE — H&P
[facility]    PATIENT'S NAME: Marge Webb   ATTENDING PHYSICIAN: Ramin Nicole MD   PATIENT ACCOUNT#:   [id number]    LOCATION:  [place] [address]  MEDICAL RECORD #:   [id number]       YOB: 1975  ADMISSION DATE:       06/30 osteomyelitis changes. MRI of the foot still pending. The patient was started on IV vancomycin and Zosyn. He will be admitted to the hospital for further management.      PAST MEDICAL HISTORY:  Hypertension and diabetes; he does not take medications curr and there is a big unroofed blister at the sole aspect of the left forefoot. Significant soft tissue swelling of the first, second, third, and fourth toes.   There is missing soft tissue at the tip of the third left toe with again foul-smelling odor and dr

## 2020-06-30 NOTE — ED NOTES
Orders for admission, patient is aware of plan and ready to go upstairs.  Any questions, please call ED MACIEL viramontes  at extension [phone]

## 2020-06-30 NOTE — PROGRESS NOTES
[facility] Dosing Service    Initial Pharmacokinetic Consult for Vancomycin Dosing     Zoraida Bose is a 39year old patient who is being treated for Left foot diabetic infection/osteomyelitis.   Pharmacy has been asked to dose Vancomycin by Dr. Avila Schooling

## 2020-06-30 NOTE — ED NOTES
Orders for admission, patient is aware of plan and ready to go upstairs. Any questions, please call ED RN Nisa Calderon at extension [phone]    PT presents to ED with cc that his left foot 3rd toe \"fell off. \" PT know diabetic, blood sugars have been WNL.  PT state

## 2020-06-30 NOTE — ED PROVIDER NOTES
Patient Seen in: [facility] AND [facility] Emergency Department      History   Patient presents with:  Wound    Stated Complaint: wound check    HPI    38 y/o male w/ documented poorly controlled NIDDM and HTN who is seen Dr. Yael Rosario in the past for wound on his rate, regular rhythm and intact distal pulses. Pulmonary/Chest: Effort normal. No respiratory distress. Abdominal: Soft. There is no tenderness. There is no guarding.    Musculoskeletal: Mild swelling of the distal lower leg and the left ankle joint an results for these tests on the individual orders.    AEROBIC BACTERIAL CULTURE          Xr Foot, Complete (min 3 Views), Left (cpt=73630)    Result Date: 6/30/2020  PROCEDURE: XR FOOT, COMPLETE (MIN 3 VIEWS), LEFT (CPT=73630)  COMPARISON: [facility] toes.  There is air density within the soft tissues at would be at the level of the left 3rd toe as well as the webspace between the 1st and 2nd metatarsals. Findings are suspicious for gas producing soft tissue infection or ulceration.   Lateral view demo PM     Finalized by (CST): Racheal Levy MD on 6/30/2020 at 1:02 PM                MDM     Spoke with [facility] at midnight for likely I&D tomorrow. Patient will be given antibiotic therapy and likely will need OR debridement and wound cleanout.

## 2020-06-30 NOTE — ED INITIAL ASSESSMENT (HPI)
Patient aox3 to ed via private vehicle patient reports \"my toe fell off\" to left 3rd toe. Patient unable to clarity further. Denies fever hx of diabetes.

## 2020-06-30 NOTE — ED NOTES
Per MD; PT to floor then will have MRI later this afternoon. Per MRI; PT will have scan around 1630 today.

## 2020-06-30 NOTE — CONSULTS
[facility]    Report of Consultation    Yenny Juarez Patient Status:  Inpatient    1975 MRN [id number]   Location [facility] 5SW/SE Attending Holger Bautista MD   Hosp Day # 0 PCP Ling Harris MD     Date of Admission: Units, Subcutaneous, 2 times per day  •  acetaminophen (TYLENOL) tab 650 mg, 650 mg, Oral, Q6H PRN  •  acetaminophen (TYLENOL) tab 650 mg, 650 mg, Oral, Q4H PRN **OR** HYDROcodone-acetaminophen (NORCO) 5-325 MG per tab 1 tablet, 1 tablet, Oral, Q4H PRN **O patient has had hallux infection before but now the infection has grown worse.   Xr Foot, Complete (min 3 Views), Left (cpt=73630)    Result Date: 6/30/2020  CONCLUSION:  1. Marked multifocal abnormalities are noted in the left foot as discussed above with Active Problem List:     HTN (hypertension)     Obesity (BMI 30.0-34.9)     Uncontrolled type 2 diabetes mellitus without complication, with long-term current use of insulin     Other male erectile dysfunction     Tinea pedis of both feet     Cellulitis in

## 2020-07-01 ENCOUNTER — APPOINTMENT (OUTPATIENT)
Dept: ULTRASOUND IMAGING | Facility: HOSPITAL | Age: 45
DRG: 240 | End: 2020-07-01
Attending: INTERNAL MEDICINE
Payer: COMMERCIAL

## 2020-07-01 LAB
ANION GAP SERPL CALC-SCNC: 5 MMOL/L (ref 0–18)
ANION GAP SERPL CALC-SCNC: 5 MMOL/L (ref 0–18)
BACTERIA UR QL AUTO: NEGATIVE /HPF
BASOPHILS # BLD AUTO: 0.08 X10(3) UL (ref 0–0.2)
BASOPHILS NFR BLD AUTO: 1.1 %
BILIRUB UR QL: NEGATIVE
BUN BLD-MCNC: 14 MG/DL (ref 7–18)
BUN BLD-MCNC: 15 MG/DL (ref 7–18)
BUN/CREAT SERPL: 8.2 (ref 10–20)
BUN/CREAT SERPL: 9.1 (ref 10–20)
CALCIUM BLD-MCNC: 9.1 MG/DL (ref 8.5–10.1)
CALCIUM BLD-MCNC: 9.2 MG/DL (ref 8.5–10.1)
CHLORIDE SERPL-SCNC: 103 MMOL/L (ref 98–112)
CHLORIDE SERPL-SCNC: 104 MMOL/L (ref 98–112)
CLARITY UR: CLEAR
CO2 SERPL-SCNC: 30 MMOL/L (ref 21–32)
CO2 SERPL-SCNC: 31 MMOL/L (ref 21–32)
COLOR UR: YELLOW
CREAT BLD-MCNC: 1.65 MG/DL (ref 0.7–1.3)
CREAT BLD-MCNC: 1.71 MG/DL (ref 0.7–1.3)
CREAT UR-SCNC: 81.5 MG/DL
CREAT UR-SCNC: 81.5 MG/DL
DEPRECATED RDW RBC AUTO: 41.6 FL (ref 35.1–46.3)
EOSINOPHIL # BLD AUTO: 0.36 X10(3) UL (ref 0–0.7)
EOSINOPHIL NFR BLD AUTO: 5.1 %
ERYTHROCYTE [DISTWIDTH] IN BLOOD BY AUTOMATED COUNT: 13.2 % (ref 11–15)
GLUCOSE BLD-MCNC: 110 MG/DL (ref 70–99)
GLUCOSE BLD-MCNC: 146 MG/DL (ref 70–99)
GLUCOSE BLDC GLUCOMTR-MCNC: 122 MG/DL (ref 70–99)
GLUCOSE BLDC GLUCOMTR-MCNC: 122 MG/DL (ref 70–99)
GLUCOSE BLDC GLUCOMTR-MCNC: 131 MG/DL (ref 70–99)
GLUCOSE BLDC GLUCOMTR-MCNC: 152 MG/DL (ref 70–99)
GLUCOSE BLDC GLUCOMTR-MCNC: 153 MG/DL (ref 70–99)
GLUCOSE UR-MCNC: NEGATIVE MG/DL
HCT VFR BLD AUTO: 30.3 % (ref 39–53)
HGB BLD-MCNC: 9.3 G/DL (ref 13–17.5)
IMM GRANULOCYTES # BLD AUTO: 0.03 X10(3) UL (ref 0–1)
IMM GRANULOCYTES NFR BLD: 0.4 %
KETONES UR-MCNC: NEGATIVE MG/DL
LEUKOCYTE ESTERASE UR QL STRIP.AUTO: NEGATIVE
LYMPHOCYTES # BLD AUTO: 1.44 X10(3) UL (ref 1–4)
LYMPHOCYTES NFR BLD AUTO: 20.2 %
MCH RBC QN AUTO: 26.6 PG (ref 26–34)
MCHC RBC AUTO-ENTMCNC: 30.7 G/DL (ref 31–37)
MCV RBC AUTO: 86.6 FL (ref 80–100)
MICROALBUMIN UR-MCNC: 0.84 MG/DL
MICROALBUMIN/CREAT 24H UR-RTO: 10.3 UG/MG (ref ?–30)
MONOCYTES # BLD AUTO: 0.74 X10(3) UL (ref 0.1–1)
MONOCYTES NFR BLD AUTO: 10.4 %
NEUTROPHILS # BLD AUTO: 4.47 X10 (3) UL (ref 1.5–7.7)
NEUTROPHILS # BLD AUTO: 4.47 X10(3) UL (ref 1.5–7.7)
NEUTROPHILS NFR BLD AUTO: 62.8 %
NITRITE UR QL STRIP.AUTO: NEGATIVE
OSMOLALITY SERPL CALC.SUM OF ELEC: 289 MOSM/KG (ref 275–295)
OSMOLALITY SERPL CALC.SUM OF ELEC: 291 MOSM/KG (ref 275–295)
PH UR: 6 [PH] (ref 5–8)
PLATELET # BLD AUTO: 427 10(3)UL (ref 150–450)
POTASSIUM SERPL-SCNC: 3.7 MMOL/L (ref 3.5–5.1)
POTASSIUM SERPL-SCNC: 4.2 MMOL/L (ref 3.5–5.1)
PROT UR-MCNC: NEGATIVE MG/DL
RBC # BLD AUTO: 3.5 X10(6)UL (ref 4.3–5.7)
RBC #/AREA URNS AUTO: 2 /HPF
SODIUM SERPL-SCNC: 139 MMOL/L (ref 136–145)
SODIUM SERPL-SCNC: 139 MMOL/L (ref 136–145)
SODIUM SERPL-SCNC: 96 MMOL/L
SP GR UR STRIP: 1.01 (ref 1–1.03)
UROBILINOGEN UR STRIP-ACNC: 2
WBC # BLD AUTO: 7.1 X10(3) UL (ref 4–11)
WBC #/AREA URNS AUTO: 2 /HPF

## 2020-07-01 PROCEDURE — 76770 US EXAM ABDO BACK WALL COMP: CPT | Performed by: INTERNAL MEDICINE

## 2020-07-01 PROCEDURE — 99233 SBSQ HOSP IP/OBS HIGH 50: CPT | Performed by: HOSPITALIST

## 2020-07-01 PROCEDURE — 99223 1ST HOSP IP/OBS HIGH 75: CPT | Performed by: INTERNAL MEDICINE

## 2020-07-01 RX ORDER — POTASSIUM CHLORIDE 14.9 MG/ML
20 INJECTION INTRAVENOUS ONCE
Status: COMPLETED | OUTPATIENT
Start: 2020-07-01 | End: 2020-07-01

## 2020-07-01 NOTE — PROGRESS NOTES
[facility]    Progress Note    Meaghan Brower Patient Status:  Inpatient    1975 MRN [id number]   Location [facility] 5SW/SE Attending Nitesh Wong MD   Hosp Day # 1 PCP Ling Sinha MD       Subjective:   Brittney Heath Normal Saline Flush, acetaminophen, acetaminophen **OR** HYDROcodone-acetaminophen **OR** HYDROcodone-acetaminophen, morphINE sulfate **OR** morphINE sulfate **OR** morphINE sulfate, ondansetron HCl, Metoclopramide HCl, glucose **OR** Glucose-Vitamin C * gas producing organisms. 2. Multiple bony abnormalities identified with a comminuted subacute appearing fracture of the midshaft of the left 2nd metatarsal, and more acute mildly displaced fractures of the proximal aspect of the 2nd and 3rd metatarsals.   Ledora Asa cortical loss. There are findings compatible with osteomyelitis involving the 2nd proximal phalanx, 2nd-4th metatarsals, and 4th proximal phalanx.   There is probable chronic osteomyelitis involving the 1st toe and 1st metatarsal.      Dictated by (CST): D

## 2020-07-01 NOTE — CONSULTS
Luis Aguilar MD.   [facility]  Vascular and Endovascular Surgery     VASCULAR SURGERY   CONSULT NOTE      Name: Jose Krueger   :   1975  [id number]     2020       REFERRING PHYSICIAN: Gunnar Justin MD  PRIMARY CARE Y •  acetaminophen (TYLENOL) tab 650 mg, 650 mg, Oral, Q4H PRN **OR** HYDROcodone-acetaminophen (NORCO) 5-325 MG per tab 1 tablet, 1 tablet, Oral, Q4H PRN **OR** HYDROcodone-acetaminophen (NORCO) 5-325 MG per tab 2 tablet, 2 tablet, Oral, Q4H PRN  •  morphIN NECK: supple, no lymphadenopathy, thyroid wnl  RESPIRATORY: no rales, rhonchi, or wheezes   CARDIO: RRR without murmur, no murmur, no gallop   ABDOMEN: soft, non-tender with no palpable aneurysm or masses  BACK: normal, no tenderness  SKIN: no rashes, warm PROCEDURE: XR FOOT, COMPLETE (MIN 3 VIEWS), LEFT (CPT=73630)  COMPARISON: [facility], XR FOOT, COMPLETE (MIN 3 VIEWS), LEFT (CPT=73630), 2/06/2019, 11:10 AM.  INDICATIONS: Wound check.  Swelling  in left foot x 3 weeks  and Findings are suspicious for gas producing soft tissue infection or ulceration. Lateral view demonstrates moderately severe soft tissue swelling of the dorsum of the midfoot with subcutaneous air density worrisome for soft tissue infection.  EFFUSION: None PROCEDURE: MRI FOOT (W+WO), LEFT (CPT=73720)  COMPARISON: [facility], XR FOOT, COMPLETE (MIN 3 VIEWS), LEFT (CPT=73630), 6/30/2020, 12:47 PM.  [facility], MRI FOOT (W+WO), LEFT (CPT=73720), 1/13/2019, 12:24 PM.  INDICATIONS: component measuring approximately 35 x 12 x 27 mm (AP x transverse x CC) and this collection has a separate perpendicular/longitudinal component extending into the wound measuring approximately 25 x 7 x 16 mm.   Separately over the lateral aspect of the ramón The patient is a 39year old male who presents with gangrene of the left foot up to about the midfoot. I reviewed Dr. Kiran Vallejo note and discussed the patient with him. I agree that the foot is not salvageable and he will need a below knee amputation.  I di

## 2020-07-01 NOTE — DIETARY NOTE
ADULT NUTRITION INITIAL ASSESSMENT    Pt is at moderate nutrition risk. Pt does not meet malnutrition criteria.       RECOMMENDATIONS TO MD:  CPM.      NUTRITION DIAGNOSIS/PROBLEM:  Increased nutrient needs related to increased demand for calories/protein unspecified type ([facility] [M86.9]    PERTINENT PAST MEDICAL HISTORY:   Past Medical History:   Diagnosis Date   • Diabetes ([facility]    • Essential hypertension    • High blood pressure    • Type II or unspecified type diabetes mellitus without mention of complic - Skin Integrity: RN documentation reviewed.  Non-healing ulcer of Left Extremity  - Marquise score 19    NUTRITION PRESCRIPTION:  Diet: NPO  Oral Supplements: None    ESTIMATED NUTRITION NEEDS:  Calories: 2500 calories/day (22 calories per kg Current wt)

## 2020-07-01 NOTE — CONSULTS
[facility]    PATIENT'S NAME: Ekaterina Torrez   ATTENDING PHYSICIAN: Ben Craig MD   CONSULTING PHYSICIAN: Chuy Broderick MD   PATIENT ACCOUNT#:   [id number]    LOCATION:  [address] [place] [address] #:   [id number]       DATE OF tract symptoms. A 10-point review of systems is otherwise unremarkable. PHYSICAL EXAMINATION:    GENERAL:  The patient is awake, alert, and oriented x3, not in any acute distress.   VITAL SIGNS:  Blood pressure 138/76, pulse of 81, respirations 19, te with medications and follow-up. Avoid nonsteroidals. Will follow. I have discussed the above with Nursing.      Dictated By Vergie Lundborg, MD  d: 07/01/2020 17:51:09  t: 07/01/2020 18:28:41  Job [id number]  RKU/

## 2020-07-01 NOTE — PROGRESS NOTES
Visited patient prior to schedule procedure to amputate left foot. Patient stated that we was experiencing depression and demonstrated substantial anxiousness.  When asked what is supporting him right now, he stated that he wanted to be present for his five

## 2020-07-01 NOTE — PROGRESS NOTES
[facility] Dosing Service  Antibiotic Dosing    Gabby Camacho is a 39year old for whom pharmacy is dosing cefepime for treatment of  diabetic foot. .  Other antibiotics: vancomycin    Allergies: is allergic to metformin.     Vitals: /64 (BP Loc

## 2020-07-01 NOTE — PLAN OF CARE
Problem: Diabetes/Glucose Control  Goal: Glucose maintained within prescribed range  Description  INTERVENTIONS:  - Monitor Blood Glucose as ordered  - Assess for signs and symptoms of hyperglycemia and hypoglycemia  - Administer ordered medications to m RN  Outcome: Not Progressing  7/1/2020 1851 by Mckenzie Carrera RN  Outcome: Not Progressing     Patient a/ox4; denies pain; VSS; NPO after midnight, awaiting surgical procedure tomorrow 7/2, left foot amp, consent obtained.

## 2020-07-01 NOTE — PLAN OF CARE
Pt A/O x4. VSS. Dressing changed due to saturation. Cleansed with saline and secured with gauze and kurlix. NPO at midnight. Q6 accucheck. Pt chart not in room, or in cabinet. Not in MRI workroom or with transport.  Unsure if pt had chart made or if it is l

## 2020-07-01 NOTE — CONSULTS
[facility] ID CONSULT NOTE    Smita Altman Patient Status:  Inpatient    1975 MRN [id number]   Location [facility] 5SW/SE Attending Mary Longoria MD   Hosp Day # 1 PCP Ling Harris MD       Reason for Cons Regular Human (NOVOLIN R) 100 UNIT/ML injection 1-11 Units, 1-11 Units, Subcutaneous, 4 times per day  •  Cefepime HCl (MAXIPIME) 1 g in sodium chloride 0.9% 100 mL MBP/add-vantage, 1 g, Intravenous, Q8H  •  vancomycin IVPB premix 1.75g in 0.9% NaCl 500 mL itching. CARDIOVASCULAR:  No chest pain, chest pressure or chest discomfort  RESPIRATORY:  No shortness of breath, cough or sputum. GASTROINTESTINAL:  No anorexia, nausea, vomiting or diarrhea. No abdominal pain or blood.   GENITOURINARY:  No Burning on u into something and has been having intermittent swelling with past 2 weeks now with above incident. Denies any associate fevers or chills. No nausea, vomiting, diarrhea. Has not been on any antibiotics. On admission T-max nine 9.2 with WBC 9.8.   Starte

## 2020-07-01 NOTE — PAYOR COMM NOTE
--------------  ADMISSION REVIEW     Payor: Jan Shannon  #:  [id number]  Authorization Number: [id number]    Admit date: 6/30/20  Admit time: 200       Admitting Physician: Aishwarya Ward MD  Attending Physician:  Katiuska Sheldon Exam     ED Triage Vitals [06/30/20 1209]   BP (!) 157/96   Pulse 115   Resp 18   Temp 97.9 °F (36.6 °C)   Temp src Temporal   SpO2 99 %   O2 Device None (Room air)       Current:/89   Pulse 94   Temp 97.9 °F (36.6 °C) (Temporal)   Resp 18   Ht 188 c - Abnormal; Notable for the following components:    RBC 3.69 (*)     HGB 9.7 (*)     HCT 31.7 (*)     MCHC 30.6 (*)     .0 (*)     All other components within normal limits   RAPID SARS-COV-2 BY PCR - Normal   CBC WITH DIFFERENTIAL WITH PLATELET fractures or periosteal response from early osteomyelitis. There is periosteal response noted at the proximal aspect of the proximal phalanx of the left 4th toe which may suggest chronic periostitis or healing stress fracture.   Irregularity of the proxima periosteal response to underlying infection. Heterogeneous bone density identified at the left 2nd and 3rd metatarsal suspect for osteomyelitis.   More chronic appearing bone destruction involving the distal aspect of the left 1st metatarsal as well as the JULIENNE CHU   ATTENDING PHYSICIAN: Evette Ruano MD   PATIENT ACCOUNT#:   [id number]    LOCATION:  [place] [address]  MEDICAL RECORD #:   [id number]       YOB: 1975  ADMISSION DATE:       06/30/2020    HISTORY AND PHYSICAL EXAMINATION    C pending. The patient was started on IV vancomycin and Zosyn. He will be admitted to the hospital for further management. PAST MEDICAL HISTORY:  Hypertension and diabetes; he does not take medications currently. PAST SURGICAL HISTORY:  None.     MED sole aspect of the left forefoot. Significant soft tissue swelling of the first, second, third, and fourth toes. There is missing soft tissue at the tip of the third left toe with again foul-smelling odor and drainage.     NEUROLOGIC:  Motor and sensory i New Bag 4.5 g Intravenous Ania Longoria    6/30/2020 2041 New Bag 4.5 g Intravenous Anais Longoria      potassium chloride IVPB premix 20 mEq     Date Action Dose Route User    7/1/2020 0916 New Bag 20 mEq Intravenous Chaparro Wagoner, RN      0.9% NaCl soft tissue swelling at the 1st and 2nd toes and to a lesser degree the 4th toe. There is marked soft tissue swelling of the dorsal aspect of the midfoot.   Air density noted between the 1st and 2nd metatarsals, as well as the soft tissue that would be at diabetic foot (HCC)     Non-healing ulcer of lower extremity, left, with unspecified severity ([address]     Uncontrolled type 2 diabetes mellitus with hyperglycemia ([address]     Diabetic foot infection ([address]     Osteomyelitis of left foot, unspecified type ([address] clear  NECK: supple, no lymphadenopathy, thyroid wnl  RESPIRATORY: no rales, rhonchi, or wheezes   CARDIO: RRR without murmur, no murmur, no gallop   ABDOMEN: soft, non-tender with no palpable aneurysm or masses  BACK: normal, no tenderness  SKIN: no rashe proximal phalanx with diffuse enhancement of this bone, suggesting osteomyelitis. There has been prior 3rd toe amputation, and the articular surface of the 3rd metatarsal head is without significant cortical bone.   There is also dark signal change along from the lateral-dorsal aspect of the wound, and it likely communicates with the aforementioned fluid collection and it measures 11 x 2 x 2 cm. These fluid collections are most compatible with abscesses.  2.  There are fracture deformities involving the 2n

## 2020-07-02 ENCOUNTER — ANESTHESIA (OUTPATIENT)
Dept: SURGERY | Facility: HOSPITAL | Age: 45
DRG: 240 | End: 2020-07-02
Payer: COMMERCIAL

## 2020-07-02 ENCOUNTER — ANESTHESIA EVENT (OUTPATIENT)
Dept: SURGERY | Facility: HOSPITAL | Age: 45
DRG: 240 | End: 2020-07-02
Payer: COMMERCIAL

## 2020-07-02 LAB
ALBUMIN SERPL-MCNC: 2.3 G/DL (ref 3.4–5)
ALBUMIN/GLOB SERPL: 0.4 {RATIO} (ref 1–2)
ALP LIVER SERPL-CCNC: 96 U/L (ref 45–117)
ALT SERPL-CCNC: 9 U/L (ref 16–61)
ANION GAP SERPL CALC-SCNC: 4 MMOL/L (ref 0–18)
ANTIBODY SCREEN: NEGATIVE
AST SERPL-CCNC: 9 U/L (ref 15–37)
BASOPHILS # BLD AUTO: 0.08 X10(3) UL (ref 0–0.2)
BASOPHILS NFR BLD AUTO: 1.2 %
BILIRUB SERPL-MCNC: 0.2 MG/DL (ref 0.1–2)
BUN BLD-MCNC: 13 MG/DL (ref 7–18)
BUN/CREAT SERPL: 8.7 (ref 10–20)
CALCIUM BLD-MCNC: 9 MG/DL (ref 8.5–10.1)
CHLORIDE SERPL-SCNC: 106 MMOL/L (ref 98–112)
CO2 SERPL-SCNC: 31 MMOL/L (ref 21–32)
CREAT BLD-MCNC: 1.49 MG/DL (ref 0.7–1.3)
DEPRECATED RDW RBC AUTO: 40.6 FL (ref 35.1–46.3)
EOSINOPHIL # BLD AUTO: 0.4 X10(3) UL (ref 0–0.7)
EOSINOPHIL NFR BLD AUTO: 5.9 %
ERYTHROCYTE [DISTWIDTH] IN BLOOD BY AUTOMATED COUNT: 13 % (ref 11–15)
GLOBULIN PLAS-MCNC: 6.2 G/DL (ref 2.8–4.4)
GLUCOSE BLD-MCNC: 108 MG/DL (ref 70–99)
GLUCOSE BLDC GLUCOMTR-MCNC: 114 MG/DL (ref 70–99)
GLUCOSE BLDC GLUCOMTR-MCNC: 119 MG/DL (ref 70–99)
GLUCOSE BLDC GLUCOMTR-MCNC: 140 MG/DL (ref 70–99)
GLUCOSE BLDC GLUCOMTR-MCNC: 180 MG/DL (ref 70–99)
GLUCOSE BLDC GLUCOMTR-MCNC: 216 MG/DL (ref 70–99)
GLUCOSE BLDC GLUCOMTR-MCNC: 239 MG/DL (ref 70–99)
HAV IGM SER QL: 2.1 MG/DL (ref 1.6–2.6)
HCT VFR BLD AUTO: 28.9 % (ref 39–53)
HGB BLD-MCNC: 8.8 G/DL (ref 13–17.5)
IMM GRANULOCYTES # BLD AUTO: 0.02 X10(3) UL (ref 0–1)
IMM GRANULOCYTES NFR BLD: 0.3 %
IRON SATURATION: 21 % (ref 20–50)
IRON SERPL-MCNC: 32 UG/DL (ref 65–175)
LYMPHOCYTES # BLD AUTO: 1.91 X10(3) UL (ref 1–4)
LYMPHOCYTES NFR BLD AUTO: 28.1 %
M PROTEIN MFR SERPL ELPH: 8.5 G/DL (ref 6.4–8.2)
MCH RBC QN AUTO: 26.2 PG (ref 26–34)
MCHC RBC AUTO-ENTMCNC: 30.4 G/DL (ref 31–37)
MCV RBC AUTO: 86 FL (ref 80–100)
MONOCYTES # BLD AUTO: 0.66 X10(3) UL (ref 0.1–1)
MONOCYTES NFR BLD AUTO: 9.7 %
NEUTROPHILS # BLD AUTO: 3.72 X10 (3) UL (ref 1.5–7.7)
NEUTROPHILS # BLD AUTO: 3.72 X10(3) UL (ref 1.5–7.7)
NEUTROPHILS NFR BLD AUTO: 54.8 %
OSMOLALITY SERPL CALC.SUM OF ELEC: 293 MOSM/KG (ref 275–295)
PHOSPHATE SERPL-MCNC: 3.8 MG/DL (ref 2.5–4.9)
PLATELET # BLD AUTO: 440 10(3)UL (ref 150–450)
POTASSIUM SERPL-SCNC: 3.8 MMOL/L (ref 3.5–5.1)
RBC # BLD AUTO: 3.36 X10(6)UL (ref 4.3–5.7)
RH BLOOD TYPE: POSITIVE
SODIUM SERPL-SCNC: 141 MMOL/L (ref 136–145)
TOTAL IRON BINDING CAPACITY: 155 UG/DL (ref 240–450)
TRANSFERRIN SERPL-MCNC: 104 MG/DL (ref 200–360)
VANCOMYCIN TROUGH SERPL-MCNC: 21 UG/ML (ref 10–20)
WBC # BLD AUTO: 6.8 X10(3) UL (ref 4–11)

## 2020-07-02 PROCEDURE — 0Y6J0Z1 DETACHMENT AT LEFT LOWER LEG, HIGH, OPEN APPROACH: ICD-10-PCS | Performed by: SURGERY

## 2020-07-02 PROCEDURE — 99233 SBSQ HOSP IP/OBS HIGH 50: CPT | Performed by: INTERNAL MEDICINE

## 2020-07-02 RX ORDER — ONDANSETRON 2 MG/ML
4 INJECTION INTRAMUSCULAR; INTRAVENOUS ONCE AS NEEDED
Status: DISCONTINUED | OUTPATIENT
Start: 2020-07-02 | End: 2020-07-02 | Stop reason: HOSPADM

## 2020-07-02 RX ORDER — MIDAZOLAM HYDROCHLORIDE 1 MG/ML
INJECTION INTRAMUSCULAR; INTRAVENOUS AS NEEDED
Status: DISCONTINUED | OUTPATIENT
Start: 2020-07-02 | End: 2020-07-02 | Stop reason: SURG

## 2020-07-02 RX ORDER — SODIUM CHLORIDE, SODIUM LACTATE, POTASSIUM CHLORIDE, CALCIUM CHLORIDE 600; 310; 30; 20 MG/100ML; MG/100ML; MG/100ML; MG/100ML
INJECTION, SOLUTION INTRAVENOUS CONTINUOUS
Status: DISCONTINUED | OUTPATIENT
Start: 2020-07-02 | End: 2020-07-02 | Stop reason: HOSPADM

## 2020-07-02 RX ORDER — DEXAMETHASONE SODIUM PHOSPHATE 4 MG/ML
VIAL (ML) INJECTION AS NEEDED
Status: DISCONTINUED | OUTPATIENT
Start: 2020-07-02 | End: 2020-07-02 | Stop reason: SURG

## 2020-07-02 RX ORDER — HYDROCODONE BITARTRATE AND ACETAMINOPHEN 5; 325 MG/1; MG/1
2 TABLET ORAL AS NEEDED
Status: DISCONTINUED | OUTPATIENT
Start: 2020-07-02 | End: 2020-07-02 | Stop reason: HOSPADM

## 2020-07-02 RX ORDER — VANCOMYCIN HYDROCHLORIDE
1250 EVERY 12 HOURS
Status: DISCONTINUED | OUTPATIENT
Start: 2020-07-03 | End: 2020-07-04

## 2020-07-02 RX ORDER — NALOXONE HYDROCHLORIDE 0.4 MG/ML
80 INJECTION, SOLUTION INTRAMUSCULAR; INTRAVENOUS; SUBCUTANEOUS AS NEEDED
Status: DISCONTINUED | OUTPATIENT
Start: 2020-07-02 | End: 2020-07-02 | Stop reason: HOSPADM

## 2020-07-02 RX ORDER — MORPHINE SULFATE 4 MG/ML
2 INJECTION, SOLUTION INTRAMUSCULAR; INTRAVENOUS EVERY 10 MIN PRN
Status: DISCONTINUED | OUTPATIENT
Start: 2020-07-02 | End: 2020-07-02 | Stop reason: HOSPADM

## 2020-07-02 RX ORDER — MORPHINE SULFATE 2 MG/ML
2 INJECTION, SOLUTION INTRAMUSCULAR; INTRAVENOUS EVERY 2 HOUR PRN
Status: DISCONTINUED | OUTPATIENT
Start: 2020-07-02 | End: 2020-07-07

## 2020-07-02 RX ORDER — MORPHINE SULFATE 4 MG/ML
4 INJECTION, SOLUTION INTRAMUSCULAR; INTRAVENOUS EVERY 2 HOUR PRN
Status: DISCONTINUED | OUTPATIENT
Start: 2020-07-02 | End: 2020-07-07

## 2020-07-02 RX ORDER — HYDROMORPHONE HYDROCHLORIDE 1 MG/ML
0.6 INJECTION, SOLUTION INTRAMUSCULAR; INTRAVENOUS; SUBCUTANEOUS EVERY 5 MIN PRN
Status: DISCONTINUED | OUTPATIENT
Start: 2020-07-02 | End: 2020-07-02 | Stop reason: HOSPADM

## 2020-07-02 RX ORDER — POTASSIUM CHLORIDE 20 MEQ/1
40 TABLET, EXTENDED RELEASE ORAL ONCE
Status: DISCONTINUED | OUTPATIENT
Start: 2020-07-02 | End: 2020-07-07

## 2020-07-02 RX ORDER — MORPHINE SULFATE 4 MG/ML
4 INJECTION, SOLUTION INTRAMUSCULAR; INTRAVENOUS EVERY 10 MIN PRN
Status: DISCONTINUED | OUTPATIENT
Start: 2020-07-02 | End: 2020-07-02 | Stop reason: HOSPADM

## 2020-07-02 RX ORDER — MORPHINE SULFATE 10 MG/ML
6 INJECTION, SOLUTION INTRAMUSCULAR; INTRAVENOUS EVERY 10 MIN PRN
Status: DISCONTINUED | OUTPATIENT
Start: 2020-07-02 | End: 2020-07-02 | Stop reason: HOSPADM

## 2020-07-02 RX ORDER — GLYCOPYRROLATE 0.2 MG/ML
INJECTION, SOLUTION INTRAMUSCULAR; INTRAVENOUS AS NEEDED
Status: DISCONTINUED | OUTPATIENT
Start: 2020-07-02 | End: 2020-07-02 | Stop reason: SURG

## 2020-07-02 RX ORDER — DEXTROSE MONOHYDRATE 25 G/50ML
50 INJECTION, SOLUTION INTRAVENOUS
Status: DISCONTINUED | OUTPATIENT
Start: 2020-07-02 | End: 2020-07-02 | Stop reason: HOSPADM

## 2020-07-02 RX ORDER — LABETALOL HYDROCHLORIDE 5 MG/ML
INJECTION, SOLUTION INTRAVENOUS AS NEEDED
Status: DISCONTINUED | OUTPATIENT
Start: 2020-07-02 | End: 2020-07-02 | Stop reason: SURG

## 2020-07-02 RX ORDER — HYDROCODONE BITARTRATE AND ACETAMINOPHEN 5; 325 MG/1; MG/1
1 TABLET ORAL AS NEEDED
Status: DISCONTINUED | OUTPATIENT
Start: 2020-07-02 | End: 2020-07-02 | Stop reason: HOSPADM

## 2020-07-02 RX ORDER — PROCHLORPERAZINE EDISYLATE 5 MG/ML
5 INJECTION INTRAMUSCULAR; INTRAVENOUS ONCE AS NEEDED
Status: DISCONTINUED | OUTPATIENT
Start: 2020-07-02 | End: 2020-07-02 | Stop reason: HOSPADM

## 2020-07-02 RX ORDER — HYDROMORPHONE HYDROCHLORIDE 1 MG/ML
0.2 INJECTION, SOLUTION INTRAMUSCULAR; INTRAVENOUS; SUBCUTANEOUS EVERY 5 MIN PRN
Status: DISCONTINUED | OUTPATIENT
Start: 2020-07-02 | End: 2020-07-02 | Stop reason: HOSPADM

## 2020-07-02 RX ORDER — METOCLOPRAMIDE HYDROCHLORIDE 5 MG/ML
INJECTION INTRAMUSCULAR; INTRAVENOUS AS NEEDED
Status: DISCONTINUED | OUTPATIENT
Start: 2020-07-02 | End: 2020-07-02 | Stop reason: SURG

## 2020-07-02 RX ORDER — AMLODIPINE BESYLATE 5 MG/1
5 TABLET ORAL DAILY
Status: DISCONTINUED | OUTPATIENT
Start: 2020-07-02 | End: 2020-07-07

## 2020-07-02 RX ORDER — ONDANSETRON 2 MG/ML
INJECTION INTRAMUSCULAR; INTRAVENOUS AS NEEDED
Status: DISCONTINUED | OUTPATIENT
Start: 2020-07-02 | End: 2020-07-02 | Stop reason: SURG

## 2020-07-02 RX ORDER — HYDROMORPHONE HYDROCHLORIDE 1 MG/ML
0.4 INJECTION, SOLUTION INTRAMUSCULAR; INTRAVENOUS; SUBCUTANEOUS EVERY 5 MIN PRN
Status: DISCONTINUED | OUTPATIENT
Start: 2020-07-02 | End: 2020-07-02 | Stop reason: HOSPADM

## 2020-07-02 RX ADMIN — LABETALOL HYDROCHLORIDE 10 MG: 5 INJECTION, SOLUTION INTRAVENOUS at 14:58:00

## 2020-07-02 RX ADMIN — ONDANSETRON 4 MG: 2 INJECTION INTRAMUSCULAR; INTRAVENOUS at 15:22:00

## 2020-07-02 RX ADMIN — MIDAZOLAM HYDROCHLORIDE 2 MG: 1 INJECTION INTRAMUSCULAR; INTRAVENOUS at 13:53:00

## 2020-07-02 RX ADMIN — GLYCOPYRROLATE 0.4 MG: 0.2 INJECTION, SOLUTION INTRAMUSCULAR; INTRAVENOUS at 13:53:00

## 2020-07-02 RX ADMIN — DEXAMETHASONE SODIUM PHOSPHATE 4 MG: 4 MG/ML VIAL (ML) INJECTION at 13:53:00

## 2020-07-02 RX ADMIN — METOCLOPRAMIDE HYDROCHLORIDE 10 MG: 5 INJECTION INTRAMUSCULAR; INTRAVENOUS at 13:53:00

## 2020-07-02 NOTE — OPERATIVE REPORT
[facility]     PATIENTS NAME: Clement Gregg  ATTENDING PHYSICIAN: Elizabeth Gilliam MD  OPERATING PHYSICIAN: Azael Florentino MD  CSN: [id number]     LOCATION:  OR  MRN: [id number]    YOB: 1975  ADMISSION DATE: 6/30/2020  OPERATION 12–15 cm, creating a posterior flap. The skin and subcutaneous tissues were incised down to the fascia. The fascia and the muscles were then divided with the electrocautery at the same level of the anterior skin incision.  The muscles in the anterior and l

## 2020-07-02 NOTE — PROGRESS NOTES
Patient scheduled for surgery today at 1300. Vanco trough scheduled for 1215; per Pharmacy, meng Rodrigues to give current dose of Vanco 1750mg after trough has been drawn so patient will not skip dose while in surgery.      1311: Vanco trough resulted 21.0, noti

## 2020-07-02 NOTE — PLAN OF CARE
Pt a/o x4. VSS. Foot dressing changed. Iv abx. NPO since midnight. Q6 accucheck. Plan for amputation of left foot at 1pm today.  Pt calls appropriately  Problem: Diabetes/Glucose Control  Goal: Glucose maintained within prescribed range  Description  INTERV

## 2020-07-02 NOTE — ANESTHESIA PROCEDURE NOTES
Airway  Date/Time: 7/2/2020 2:01 PM  Urgency: Elective    Airway not difficult    General Information and Staff    Patient location during procedure: OR  Anesthesiologist: Demetria Ford MD  Performed: anesthesiologist     Indications and Patient Isabella

## 2020-07-02 NOTE — ANESTHESIA PREPROCEDURE EVALUATION
Anesthesia PreOp Note    HPI:     Kingston Conrad is a 39year old male who presents for preoperative consultation requested by:  Zay Pickard MD    Date of Surgery: 6/30/2020 - 7/2/2020    Procedure(s):  KNEE AMPUTATION ABOVE/BELOW  Indication: left f units, Disp: 10 mL, Rfl: 0, Past Month at Unknown time  insulin glargine 100 UNIT/ML Subcutaneous Solution, Inject 20 Units into the skin nightly., Disp: 10 mL, Rfl: 0, Past Month at Unknown time  hydrochlorothiazide 25 MG Oral Tab, Take 1 tablet (25 mg to sulfate (PF) 2 MG/ML injection 1 mg, 1 mg, Intravenous, Q2H PRN, Elizabeth Moseley MD    Or  [facility] Hold] morphINE sulfate (PF) 2 MG/ML injection 2 mg, 2 mg, Intravenous, Q2H PRN, Elizabeth Moseley MD    Or  [MAR Hold] morphINE sulfate (PF) 4 MG/ML injection 4 other day    Substance and Sexual Activity      Alcohol use: Yes        Comment: Occasionally       Drug use: No      Sexual activity: Not on file    Lifestyle      Physical activity:        Days per week: Not on file        Minutes per session: Not on germán TempSrc: Oral Oral  Oral   SpO2: 100% 100%  99%   Weight:   107.2 kg (236 lb 6.4 oz)    Height:            Anesthesia Evaluation     Patient summary reviewed and Nursing notes reviewed    Airway   Mallampati: II  TM distance: >3 FB  Neck ROM: full  Denta

## 2020-07-02 NOTE — PAYOR COMM NOTE
--------------  CONTINUED STAY REVIEW------ADDITIONAL CLINICALS FOR INPATIENT CONSIDERATION    Payor: Jan Rojo #:  [id number]  Authorization Number: [id number]    Admit date: 6/30/20  Admit time: 200    Admitting Physician: Damian Alvarenga Kesha Ortiz is a a(n) 39year old male. Patient is a 59-year-old male with diabetes who is currently not on treatment diet-controlled with last A1c of 7.8 on admission now presents the hospital on 6/30 with complaints of left 3rd toe \"fell off\".   H •  vancomycin IVPB premix 1.75g in 0.9% NaCl 500 mL, 1,750 mg, Intravenous, Q12H  •  Normal Saline Flush 0.9 % injection 3 mL, 3 mL, Intravenous, PRN  •  0.9% NaCl infusion, , Intravenous, Continuous  •  Heparin Sodium (Porcine) 5000 UNIT/ML injection 5,00 GENITOURINARY:  No Burning on urination. NEUROLOGICAL:  No headache, dizziness, syncope, paralysis, ataxia, numbness or tingling in the extremities.   MUSCULOSKELETAL:  No muscle, back pain, joint pain or stiffness.     Physical Exam:  Vital signs: Blood 20-year-old male with diabetes who is currently not on treatment diet-controlled with last A1c of 7.8 on admission now presents the hospital on 6/30 with complaints of left 3rd toe \"fell off\".   Had a injury to the left foot about a month prior when he bu MEDICAL RECORD #:   [id number]       YOB: 1975  ADMISSION DATE:       06/30/2020      CONSULT DATE:  07/01/2020     REPORT OF CONSULTATION        HISTORY OF PRESENT ILLNESS:  The patient is a 44-year-old  male with a histor VITAL SIGNS:  Blood pressure 138/76, pulse of 81, respirations 19, temperature 98.3, O2 saturation was 98% on room air, and he weighed 238 pounds. HEENT:  Hydration appeared to be adequate. NECK:  Supple without JVD or lymphadenopathy.   LUNGS:  Clear t d:     07/01/2020 17:51:09  t:      07/01/2020 18:28:41  [facility]   [id number]  [facility]/           Consults signed by Anjana Dhaliwal MD at 7/1/2020 11:55 AM     Author:  Anjana Dhaliwal MD Service: Vascular Surgery Author Type: Physician   Filed: 7/1/202 •  Piperacillin Sod-Tazobactam So (ZOSYN) 4.5 g in dextrose 5 % 100 mL MBP/ADD-vantage, 4.5 g, Intravenous, Q8H  •  vancomycin IVPB premix 1.75g in 0.9% NaCl 500 mL, 1,750 mg, Intravenous, Q12H  •  Normal Saline Flush 0.9 % injection 3 mL, 3 mL, Intravenou Comprehensive ROS reviewed and negative except for what's stated above.   Including negative for chest pain, shortness of breath, syncope.      EXAM:  /64 (BP Location: Right arm)   Pulse 79   Temp 98.2 °F (36.8 °C) (Oral)   Resp 18   Ht 6' 2\" (1.88 Result Date: 6/30/2020 PROCEDURE:   XR FOOT, COMPLETE (MIN 3 VIEWS), LEFT (CPT=73630)  COMPARISON:      [facility], XR FOOT, COMPLETE (MIN 3 VIEWS), LEFT (CPT=73630), 2/06/2019, 11:10 AM.  INDICATIONS: Wound check.  Swelling  in left foot x 3 week the 1st and 2nd metatarsals. Findings are suspicious for gas producing soft tissue infection or ulceration.   Lateral view demonstrates moderately severe soft tissue swelling of the dorsum of the midfoot with subcutaneous air density worrisome for soft tis    Result Date: 6/30/2020 PROCEDURE:   MRI FOOT (W+WO), LEFT (CPT=73720)  COMPARISON:           [facility], XR FOOT, COMPLETE (MIN 3 VIEWS), LEFT (CPT=73630), 6/30/2020, 12:47 PM.  [facility], MRI FOOT (W+WO), LEFT (CPT=73720), 1/13/2019, 12:24 PM. draining along the skin surface.   It has a horizontal component measuring approximately 35 x 12 x 27 mm (AP x transverse x CC) and this collection has a separate perpendicular/longitudinal component extending into the wound measuring approximately 25 x 7 x The patient is a 39year old male who presents with gangrene of the left foot up to about the midfoot. I reviewed Dr. Deisy Campos note and discussed the patient with him. I agree that the foot is not salvageable and he will need a below knee amputation.  I di Last data filed at 7/1/2020 1107      Gross per 24 hour   Intake 2048. 33 ml   Output 2250 ml   Net -201.67 ml            GENERAL:  The patient appeared to be in no distress and was comfortable.   SKIN:  Warm and hydrated  PSYCHIATRIC: Calm and cooperative    A1c:        Lab Results   Component Value Date     A1C 7.8 (H) 06/30/2020     A1C 6.9 (H) 01/12/2019         Culture:        Hospital Encounter on 06/30/20   1.  AEROBIC BACTERIAL CULTURE     Status: Abnormal (Preliminary result)     Collection Time: 06/ CONCLUSION:  1. Marked multifocal abnormalities are noted in the left foot as discussed above with significant soft tissue swelling at the 1st and 2nd toes and to a lesser degree the 4th toe.   There is marked soft tissue swelling of the dorsal aspect of th CONCLUSION:  1. Prior 3rd toe amputation.   There is probable drainage from the amputation stump as there is an L-shaped fluid collection which has 2 components measuring 3 x 1 x 3 cm and 3 x 7 x 2 cm which extend from the 2nd metatarsophalangeal joint tow 7/2/2020 0620 New Bag 1 g Intravenous Anais Longoria    7/1/2020 2134 New Bag 1 g Intravenous Anais Longoria    7/1/2020 1510 New Bag 1 g Intravenous Yanni Stephen RN      Insulin Regular Human (NOVOLIN R) 100 UNIT/ML injection 1-11 Units     Date Ac

## 2020-07-02 NOTE — PROGRESS NOTES
[facility]    Progress Note    Evy Eastman Patient Status:  Inpatient    1975 MRN [id number]   Location [facility] [place] 5SW/SE Attending Darlene Mata MD   Hosp Day # 2 PCP Ling Harris MD       Subjective:   Was not s stress fractures or subacute periosteal response to underlying infection. Heterogeneous bone density identified at the left 2nd and 3rd metatarsal suspect for osteomyelitis.   More chronic appearing bone destruction involving the distal aspect of the left 1.4 (H) 01/13/2019       Assessment and Plan:       Left foot diabetic foot with multiple abscess   Acute and chronic osteomyelitis  -stopped IV zosyn and started cefepime  -cont IV vanco   -plan for BKA today  -ID, vascular and podiatry following    Acute

## 2020-07-02 NOTE — ANESTHESIA POSTPROCEDURE EVALUATION
Patient: Shin Hadry    Procedure Summary     Date:  07/02/20 Room / Location:  [address] [place] [address] MAIN OR 01 / [address] [facility] MAIN OR    Anesthesia Start:  3397 Anesthesia Stop:      Procedure:  KNEE AMPUTATION ABOVE/BELOW (Left Lower Leg) Diagnosis:  (left foot gangrene) Bill For Surgical Tray: no Billing Type: Third-Party Bill

## 2020-07-02 NOTE — PROGRESS NOTES
[facility] dosing service    Follow-up Pharmacokinetic Consult for Vancomycin Dosing     Meaghan Brower is a 39year old patient who is being treated for osteomyelitis.    Patient is on day 3 of Vancomycin and is currently receiving 1.75 gm IV Q 12 graham Goal trough level 15-20 ug/mL. 3.  Pharmacy will need Scr daily while on Vancomycin to assess renal function. 4.  Pharmacy will follow and adjust as necessary. We appreciate the opportunity to assist in the care of this patient.     Michael Lugo,

## 2020-07-03 LAB
ALBUMIN SERPL-MCNC: 2.2 G/DL (ref 3.4–5)
ANION GAP SERPL CALC-SCNC: 3 MMOL/L (ref 0–18)
BASOPHILS # BLD AUTO: 0.03 X10(3) UL (ref 0–0.2)
BASOPHILS NFR BLD AUTO: 0.3 %
BUN BLD-MCNC: 14 MG/DL (ref 7–18)
BUN/CREAT SERPL: 9.7 (ref 10–20)
CALCIUM BLD-MCNC: 8.7 MG/DL (ref 8.5–10.1)
CHLORIDE SERPL-SCNC: 105 MMOL/L (ref 98–112)
CO2 SERPL-SCNC: 31 MMOL/L (ref 21–32)
CREAT BLD-MCNC: 1.44 MG/DL (ref 0.7–1.3)
DEPRECATED RDW RBC AUTO: 39.6 FL (ref 35.1–46.3)
EOSINOPHIL # BLD AUTO: 0.01 X10(3) UL (ref 0–0.7)
EOSINOPHIL NFR BLD AUTO: 0.1 %
ERYTHROCYTE [DISTWIDTH] IN BLOOD BY AUTOMATED COUNT: 12.7 % (ref 11–15)
GLUCOSE BLD-MCNC: 118 MG/DL (ref 70–99)
GLUCOSE BLDC GLUCOMTR-MCNC: 112 MG/DL (ref 70–99)
GLUCOSE BLDC GLUCOMTR-MCNC: 126 MG/DL (ref 70–99)
GLUCOSE BLDC GLUCOMTR-MCNC: 131 MG/DL (ref 70–99)
GLUCOSE BLDC GLUCOMTR-MCNC: 133 MG/DL (ref 70–99)
HAV IGM SER QL: 2.2 MG/DL (ref 1.6–2.6)
HCT VFR BLD AUTO: 26.7 % (ref 39–53)
HGB BLD-MCNC: 8.2 G/DL (ref 13–17.5)
IMM GRANULOCYTES # BLD AUTO: 0.05 X10(3) UL (ref 0–1)
IMM GRANULOCYTES NFR BLD: 0.4 %
LYMPHOCYTES # BLD AUTO: 1.85 X10(3) UL (ref 1–4)
LYMPHOCYTES NFR BLD AUTO: 16.6 %
MCH RBC QN AUTO: 26.5 PG (ref 26–34)
MCHC RBC AUTO-ENTMCNC: 30.7 G/DL (ref 31–37)
MCV RBC AUTO: 86.1 FL (ref 80–100)
MONOCYTES # BLD AUTO: 0.78 X10(3) UL (ref 0.1–1)
MONOCYTES NFR BLD AUTO: 7 %
NEUTROPHILS # BLD AUTO: 8.42 X10 (3) UL (ref 1.5–7.7)
NEUTROPHILS # BLD AUTO: 8.42 X10(3) UL (ref 1.5–7.7)
NEUTROPHILS NFR BLD AUTO: 75.6 %
OSMOLALITY SERPL CALC.SUM OF ELEC: 290 MOSM/KG (ref 275–295)
PHOSPHATE SERPL-MCNC: 3.9 MG/DL (ref 2.5–4.9)
PLATELET # BLD AUTO: 414 10(3)UL (ref 150–450)
POTASSIUM SERPL-SCNC: 4.7 MMOL/L (ref 3.5–5.1)
RBC # BLD AUTO: 3.1 X10(6)UL (ref 4.3–5.7)
SODIUM SERPL-SCNC: 139 MMOL/L (ref 136–145)
WBC # BLD AUTO: 11.1 X10(3) UL (ref 4–11)

## 2020-07-03 PROCEDURE — 99233 SBSQ HOSP IP/OBS HIGH 50: CPT | Performed by: HOSPITALIST

## 2020-07-03 PROCEDURE — 99233 SBSQ HOSP IP/OBS HIGH 50: CPT | Performed by: INTERNAL MEDICINE

## 2020-07-03 RX ORDER — GABAPENTIN 100 MG/1
100 CAPSULE ORAL 3 TIMES DAILY
Status: DISCONTINUED | OUTPATIENT
Start: 2020-07-03 | End: 2020-07-07

## 2020-07-03 RX ORDER — LISINOPRIL 10 MG/1
10 TABLET ORAL DAILY
Status: DISCONTINUED | OUTPATIENT
Start: 2020-07-03 | End: 2020-07-07

## 2020-07-03 NOTE — PROGRESS NOTES
[facility] [facility] - [facility]    Progress Note    Darlys Narciso Patient Status:  Inpatient    1975 MRN [id number]   Location [facility] 5SW/SE Attending Marko Crespo MD   Hosp Day # 3 PCP Ling Munroe MD       Subjective:   Anabella Sos Sodium (Porcine)  5,000 Units Subcutaneous 2 times per day       Current PRN Inpatient Meds:      morphINE sulfate **OR** morphINE sulfate, Normal Saline Flush, acetaminophen, acetaminophen **OR** HYDROcodone-acetaminophen **OR** HYDROcodone-acetaminophen, N/A       Susceptibility    Staphylococcus aureus -  (no method available)     Clindamycin <=0.25 Sensitive      Erythromycin <=0.25 Sensitive      Gentamicin 8 Intermediate      Levofloxacin 0.25 Sensitive      Trimethoprim/Sulfa <=10 Sensitive      Vanco Recommend MRI scanning of the left foot with contrast to further evaluate. See above. 3. Recommend further evaluation with left foot MRI with contrast and vascular surgery consultation.      Dictated by (CST): Anjana Moreira MD on 6/30/2020 at 12:53 PM hold ACEi and HCTZ  -asked renal to see   -renal US ok  -improving      HTN  -monitor  -added norvasc     DM II  Last A1c value was 7.8% done 6/30/2020.  -cont sliding scale   -seen by RD  -needs outpatient DM ED     VTE P: heparin    Greater than 35 minut

## 2020-07-03 NOTE — CONSULTS
Consult requested. 39year old man admitted 6/30 for 4 week history of swelling, pain, and foul swelling odor from left foot. Vascular surgery evaluated left lower extremity diabetic foot and determined non salvagable and he underwent left BKA on 7/2.

## 2020-07-03 NOTE — PROGRESS NOTES
[facility]    Progress Note    Kingston Conrad Patient Status:  Inpatient    1975 MRN [id number]   Location [facility] 5SW/SE Attending Laurent Rivera MD   Hosp Day # 3 PCP Ling Aponte MD       Subjective:   Elly Huynh no abnormal bruising noted  Back/Spine: no abnormalities noted  Musculoskeletal: full ROM all extremities good strength  no deformities  Extremities:left bka  Neurological:  Grossly normal    Results:     Laboratory Data:  Lab Results   Component Value Rajesh

## 2020-07-03 NOTE — PLAN OF CARE
Problem: Diabetes/Glucose Control  Goal: Glucose maintained within prescribed range  Description  INTERVENTIONS:  - Monitor Blood Glucose as ordered  - Assess for signs and symptoms of hyperglycemia and hypoglycemia  - Administer ordered medications to m RN  Outcome: Progressing  7/3/2020 0507 by Ivana Carrillo RN  Outcome: Progressing     Problem: SKIN/TISSUE INTEGRITY - ADULT  Goal: Incision(s), wounds(s) or drain site(s) healing without S/S of infection  Description  INTERVENTIONS:  - Assess and docu

## 2020-07-03 NOTE — PROGRESS NOTES
[facility] ID PROGRESS NOTE    Ishmael Rice Patient Status:  Inpatient    1975 MRN [id number]   Location [facility] 5SW/SE Attending Cathi Dasilva MD   Hosp Day # 3 PCP Ling Harris MD     Subjective:  No fever. HDS. Awake and relaxed. of left 3rd toe \"fell off\". Had a injury to the left foot about a month prior when he bumped into something and has been having intermittent swelling with past 2 weeks now with above incident. Denies any associate fevers or chills.   No nausea, vomiting

## 2020-07-03 NOTE — PROGRESS NOTES
[facility] - [facility]    Progress Note    Devin Martínez Patient Status:  Inpatient    1975 MRN [id number]   Location [facility] 5SW/SE Attending Harris Sheldon MD   Hosp Day # 2 PCP Ling Paez MD       Subjective:   Lawrence Ros noted  Back/Spine: no abnormalities noted  Musculoskeletal: full ROM all extremities good strength  no deformities  Extremities: left foot amputation  Neurological:  Grossly normal    Results:     Laboratory Data:  Lab Results   Component Value Date    WBC

## 2020-07-03 NOTE — PLAN OF CARE
A/Ox4. Fall risk precautions appropriate for pt: bed in  lowest position, call light within reach. Inmobilizer to the LLE in place. LLE elevated. NWB to the LLE maintained. Pain controlled with Morphine. One dose of Zofran given for nausea.   One episode Incorporate patient and family knowledge, values, beliefs, and cultural backgrounds into the planning and delivery of care  - Encourage patient/family to participate in care and decision-making at the level they choose  - Honor patient and family perspecti

## 2020-07-03 NOTE — PROGRESS NOTES
[facility] - [facility]     Vascular Surgery Progress Note    Crissy Agee Patient Status:  Inpatient    1975 MRN [id number]   Location [facility] 5SW/SE Attending Belén Lockwood MD   Hosp Day # 3 PCP MD Thalia Teran Or  HYDROcodone-acetaminophen (NORCO) 5-325 MG per tab 1 tablet, 1 tablet, Oral, Q4H PRN    Or  HYDROcodone-acetaminophen (NORCO) 5-325 MG per tab 2 tablet, 2 tablet, Oral, Q4H PRN  morphINE sulfate (PF) 2 MG/ML injection 1 mg, 1 mg, Intravenous, Q2H PRN

## 2020-07-03 NOTE — PAYOR COMM NOTE
--------------  CONTINUED STAY REVIEW-------CLINICAL UPDATE FOR 7/3      Payor: Jan Shiva Rojo #:  [id number]  Authorization Number: [id number]    Admit date: 6/30/20  Admit time: 200    Admitting Physician: Jill George MD  Attendi Insulin Aspart Pen (NOVOLOG) 100 UNIT/ML flexpen 1-11 Units, 1-11 Units, Subcutaneous, TID CC  Cefepime HCl (MAXIPIME) 1 g in sodium chloride 0.9% 100 mL MBP/add-vantage, 1 g, Intravenous, Q8H  Normal Saline Flush 0.9 % injection 3 mL, 3 mL, Intravenous, P No results for input(s): PTP, INR, PTT in the last 168 hours. Recent Labs   Lab 07/02/20  0621 07/03/20  0602   ALT 9*  --    AST 9*  --    ALB 2.3* 2.2*      No results for input(s): TROP in the last 168 hours.   No results found for: ANAS, SEUN, ANAS glycopyrrolate (ROBINUL) 0.2 MG/ML injection     Date Action Dose Route User    7/2/2020 1353 Given 0.4 mg Intravenous Renata Ramirez MD      Heparin Sodium (Porcine) 5000 UNIT/ML injection 5,000 Units     Date Action Dose Route User    7/3/2020 0801 Gi morphINE sulfate (PF) 4 MG/ML injection 4 mg     Date Action Dose Route User    7/3/2020 0758 Given 4 mg Intravenous Lord Eva RN    7/2/2020 1843 Given 4 mg Intravenous Chintan Weathers RN      ondansetron HCl [facility]) injection 4 mg     Date Acti

## 2020-07-04 LAB
ANION GAP SERPL CALC-SCNC: 3 MMOL/L (ref 0–18)
BASOPHILS # BLD AUTO: 0.06 X10(3) UL (ref 0–0.2)
BASOPHILS NFR BLD AUTO: 0.6 %
BUN BLD-MCNC: 15 MG/DL (ref 7–18)
BUN/CREAT SERPL: 9.2 (ref 10–20)
CALCIUM BLD-MCNC: 9.1 MG/DL (ref 8.5–10.1)
CHLORIDE SERPL-SCNC: 104 MMOL/L (ref 98–112)
CO2 SERPL-SCNC: 34 MMOL/L (ref 21–32)
CREAT BLD-MCNC: 1.63 MG/DL (ref 0.7–1.3)
DEPRECATED RDW RBC AUTO: 40.5 FL (ref 35.1–46.3)
EOSINOPHIL # BLD AUTO: 0.19 X10(3) UL (ref 0–0.7)
EOSINOPHIL NFR BLD AUTO: 2 %
ERYTHROCYTE [DISTWIDTH] IN BLOOD BY AUTOMATED COUNT: 12.9 % (ref 11–15)
GLUCOSE BLD-MCNC: 128 MG/DL (ref 70–99)
GLUCOSE BLDC GLUCOMTR-MCNC: 114 MG/DL (ref 70–99)
GLUCOSE BLDC GLUCOMTR-MCNC: 123 MG/DL (ref 70–99)
GLUCOSE BLDC GLUCOMTR-MCNC: 153 MG/DL (ref 70–99)
GLUCOSE BLDC GLUCOMTR-MCNC: 188 MG/DL (ref 70–99)
HCT VFR BLD AUTO: 28.2 % (ref 39–53)
HGB BLD-MCNC: 8.6 G/DL (ref 13–17.5)
IMM GRANULOCYTES # BLD AUTO: 0.03 X10(3) UL (ref 0–1)
IMM GRANULOCYTES NFR BLD: 0.3 %
LYMPHOCYTES # BLD AUTO: 1.52 X10(3) UL (ref 1–4)
LYMPHOCYTES NFR BLD AUTO: 16 %
MCH RBC QN AUTO: 26.3 PG (ref 26–34)
MCHC RBC AUTO-ENTMCNC: 30.5 G/DL (ref 31–37)
MCV RBC AUTO: 86.2 FL (ref 80–100)
MONOCYTES # BLD AUTO: 0.81 X10(3) UL (ref 0.1–1)
MONOCYTES NFR BLD AUTO: 8.5 %
NEUTROPHILS # BLD AUTO: 6.91 X10 (3) UL (ref 1.5–7.7)
NEUTROPHILS # BLD AUTO: 6.91 X10(3) UL (ref 1.5–7.7)
NEUTROPHILS NFR BLD AUTO: 72.6 %
OSMOLALITY SERPL CALC.SUM OF ELEC: 294 MOSM/KG (ref 275–295)
PLATELET # BLD AUTO: 404 10(3)UL (ref 150–450)
POTASSIUM SERPL-SCNC: 4.2 MMOL/L (ref 3.5–5.1)
RBC # BLD AUTO: 3.27 X10(6)UL (ref 4.3–5.7)
SODIUM SERPL-SCNC: 141 MMOL/L (ref 136–145)
WBC # BLD AUTO: 9.5 X10(3) UL (ref 4–11)

## 2020-07-04 PROCEDURE — 99233 SBSQ HOSP IP/OBS HIGH 50: CPT | Performed by: INTERNAL MEDICINE

## 2020-07-04 PROCEDURE — 99233 SBSQ HOSP IP/OBS HIGH 50: CPT | Performed by: HOSPITALIST

## 2020-07-04 RX ORDER — DOCUSATE SODIUM 100 MG/1
100 CAPSULE, LIQUID FILLED ORAL 2 TIMES DAILY
Status: DISCONTINUED | OUTPATIENT
Start: 2020-07-04 | End: 2020-07-07

## 2020-07-04 RX ORDER — POLYETHYLENE GLYCOL 3350 17 G/17G
17 POWDER, FOR SOLUTION ORAL DAILY
Status: DISCONTINUED | OUTPATIENT
Start: 2020-07-04 | End: 2020-07-07

## 2020-07-04 NOTE — PROGRESS NOTES
[place] [facility]    Progress Note    Nadege Pair Patient Status:  Inpatient    1975 MRN [id number]   Location [facility] 5SW/SE Attending Nancy Mcintosh MD   [facility] Day # 4 PCP Ling Serrano MD       Subjective:   Mc Colon no abnormal bruising noted  Back/Spine: no abnormalities noted  Musculoskeletal: full ROM all extremities good strength  no deformities  Extremities: left bka  Neurological:  Grossly normal    Results:     Laboratory Data:  Lab Results   Component Value Da

## 2020-07-04 NOTE — PROGRESS NOTES
[facility] ID PROGRESS NOTE    Vlad Bunch Patient Status:  Inpatient    1975 MRN [id number]   Location [facility] 5SW/SE Attending Vicki Murillo MD   Hosp Day # 4 PCP Ling Harris MD     Subjective:  No fever. HDS.  Awake sitting at [facility] the hospital on 6/30 with complaints of left 3rd toe \"fell off\". Had a injury to the left foot about a month prior when he bumped into something and has been having intermittent swelling with past 2 weeks now with above incident.   Denies any associate f

## 2020-07-04 NOTE — PLAN OF CARE
Problem: Diabetes/Glucose Control  Goal: Glucose maintained within prescribed range  Description  INTERVENTIONS:  - Monitor Blood Glucose as ordered  - Assess for signs and symptoms of hyperglycemia and hypoglycemia  - Administer ordered medications to m sites and surrounding tissue  - Implement wound care per orders  - Initiate isolation precautions as appropriate  - Initiate Pressure Ulcer prevention bundle as indicated  Outcome: Progressing     Problem: PAIN - ADULT  Goal: Verbalizes/displays adequate c

## 2020-07-04 NOTE — OCCUPATIONAL THERAPY NOTE
OCCUPATIONAL THERAPY EVALUATION - INPATIENT     Room Number: 526/526-A  Evaluation Date: 7/4/2020  Type of Evaluation: Initial       Physician Order: IP Consult to Occupational Therapy  Reason for Therapy: ADL/IADL Dysfunction and Discharge Planning    [facility] based on documentation in the [facility] '6 clicks' Inpatient Daily Activity Short Form. Research supports that patients with this level of impairment may benefit from ACUTE rehab.      DISCHARGE RECOMMENDATIONS  OT Discharge Recommendations: Acute rehabilitati COGNITION  Overall Cognitive Status:  WFL - within functional limits    Communication: WNL    Behavioral/Emotional/Social: Cooperative, pleasant    RANGE OF MOTION   Upper extremity ROM is within functional limits     STRENGTH ASSESSMENT  Upper extremi

## 2020-07-04 NOTE — PROGRESS NOTES
[place] [facility] - [facility]    Progress Note    Sanna Robertson Patient Status:  Inpatient    1975 MRN [id number]   Location [facility] 5SW/SE Attending Zabrina Peñaloza MD   [facility] Day # 4 PCP Ling Harris MD       Subjective:     Pain cont RD  -needs outpatient DM ED     dvt proph: heparin      Code status:  Full       >35 minutes spent    Hugh Ryan MD  7/4/2020

## 2020-07-04 NOTE — PROGRESS NOTES
PHYSICAL THERAPY EVALUATION - INPATIENT     Room Number: 526/526-A  Evaluation Date: 7/4/2020  Type of Evaluation: initial  Physician Order: PT Eval and Treat    Presenting Problem: Pt admitted to ER for evaluation of swelling, pain, and foul-smelling odo result in injury and re-hospitalization. Pt will need amputee w/c, RW and stump  at this time. Patient will benefit from continued IP PT services to address these deficits in preparation for discharge.     DISCHARGE RECOMMENDATIONS  PT Discharge )    COGNITION  ·         BALANCE                ADDITIONAL TESTS                                    NEUROLOGICAL FINDINGS                      ACTIVITY TOLERANCE                         O2 WALK  SPO2 on Room Air at Rest: 96               AM-PAC '6-Clicks' Patient will negotiate 2 stairs/one curb w/ assistive device and supervision   Goal #4   Current Status    Goal #5 Patient to demonstrate independence with home activity/exercise instructions provided to patient in preparation for discharge.    Goal #5   Cu

## 2020-07-04 NOTE — PROGRESS NOTES
[facility] - [facility]     Vascular Surgery Progress Note    Divine Mary Patient Status:  Inpatient    1975 MRN [id number]   Location [facility] 5SW/SE Attending Rebecca Scott MD   Hosp Day # 4 PCP MD Melany Garcia Or  HYDROcodone-acetaminophen (NORCO) 5-325 MG per tab 1 tablet, 1 tablet, Oral, Q4H PRN    Or  HYDROcodone-acetaminophen (NORCO) 5-325 MG per tab 2 tablet, 2 tablet, Oral, Q4H PRN  morphINE sulfate (PF) 2 MG/ML injection 1 mg, 1 mg, Intravenous, Q2H PRN

## 2020-07-05 LAB
ANION GAP SERPL CALC-SCNC: 5 MMOL/L (ref 0–18)
BASOPHILS # BLD AUTO: 0.08 X10(3) UL (ref 0–0.2)
BASOPHILS NFR BLD AUTO: 0.8 %
BUN BLD-MCNC: 19 MG/DL (ref 7–18)
BUN/CREAT SERPL: 11.5 (ref 10–20)
CALCIUM BLD-MCNC: 8.9 MG/DL (ref 8.5–10.1)
CHLORIDE SERPL-SCNC: 104 MMOL/L (ref 98–112)
CO2 SERPL-SCNC: 33 MMOL/L (ref 21–32)
CREAT BLD-MCNC: 1.65 MG/DL (ref 0.7–1.3)
DEPRECATED RDW RBC AUTO: 41.7 FL (ref 35.1–46.3)
EOSINOPHIL # BLD AUTO: 0.4 X10(3) UL (ref 0–0.7)
EOSINOPHIL NFR BLD AUTO: 3.8 %
ERYTHROCYTE [DISTWIDTH] IN BLOOD BY AUTOMATED COUNT: 13.3 % (ref 11–15)
GLUCOSE BLD-MCNC: 117 MG/DL (ref 70–99)
GLUCOSE BLDC GLUCOMTR-MCNC: 127 MG/DL (ref 70–99)
GLUCOSE BLDC GLUCOMTR-MCNC: 132 MG/DL (ref 70–99)
GLUCOSE BLDC GLUCOMTR-MCNC: 135 MG/DL (ref 70–99)
GLUCOSE BLDC GLUCOMTR-MCNC: 151 MG/DL (ref 70–99)
HCT VFR BLD AUTO: 26.3 % (ref 39–53)
HGB BLD-MCNC: 8.1 G/DL (ref 13–17.5)
IMM GRANULOCYTES # BLD AUTO: 0.06 X10(3) UL (ref 0–1)
IMM GRANULOCYTES NFR BLD: 0.6 %
LYMPHOCYTES # BLD AUTO: 1.91 X10(3) UL (ref 1–4)
LYMPHOCYTES NFR BLD AUTO: 18.3 %
MCH RBC QN AUTO: 26.6 PG (ref 26–34)
MCHC RBC AUTO-ENTMCNC: 30.8 G/DL (ref 31–37)
MCV RBC AUTO: 86.5 FL (ref 80–100)
MONOCYTES # BLD AUTO: 0.84 X10(3) UL (ref 0.1–1)
MONOCYTES NFR BLD AUTO: 8 %
NEUTROPHILS # BLD AUTO: 7.15 X10 (3) UL (ref 1.5–7.7)
NEUTROPHILS # BLD AUTO: 7.15 X10(3) UL (ref 1.5–7.7)
NEUTROPHILS NFR BLD AUTO: 68.5 %
OSMOLALITY SERPL CALC.SUM OF ELEC: 297 MOSM/KG (ref 275–295)
PLATELET # BLD AUTO: 376 10(3)UL (ref 150–450)
POTASSIUM SERPL-SCNC: 4 MMOL/L (ref 3.5–5.1)
RBC # BLD AUTO: 3.04 X10(6)UL (ref 4.3–5.7)
SODIUM SERPL-SCNC: 142 MMOL/L (ref 136–145)
WBC # BLD AUTO: 10.4 X10(3) UL (ref 4–11)

## 2020-07-05 PROCEDURE — 99232 SBSQ HOSP IP/OBS MODERATE 35: CPT | Performed by: HOSPITALIST

## 2020-07-05 PROCEDURE — 99232 SBSQ HOSP IP/OBS MODERATE 35: CPT | Performed by: INTERNAL MEDICINE

## 2020-07-05 NOTE — PROGRESS NOTES
[place] [facility] - [facility]    Progress Note    Melba London Patient Status:  Inpatient    1975 MRN [id number]   Location [facility] 5SW/SE Attending Jada Sanchez MD   Hosp Day # 5 PCP Ling Sinha MD       Subjective:   Joy Olivia Subcutaneous 2 times per day       Current PRN Inpatient Meds:      magnesium hydroxide, morphINE sulfate **OR** morphINE sulfate, Normal Saline Flush, acetaminophen, acetaminophen **OR** HYDROcodone-acetaminophen **OR** HYDROcodone-acetaminophen, morphINE Gram Positive Cocci N/A    Aerobic Smear 4+ Gram Negative Rods N/A       Susceptibility    Staphylococcus aureus -  (no method available)     Cefazolin  Sensitive      Clindamycin <=0.25 Sensitive      Erythromycin <=0.25 Sensitive      Gentamicin 8 Interm phalanx of the left great toe suggesting more chronic osteomyelitis changes, although I can not exclude acute infection. Recommend MRI scanning of the left foot with contrast to further evaluate. See above.  3. Recommend further evaluation with left foot rehab, SW for arrangements   -gabapentin for pain  -stump  ordered     Acute kidney injury  -adjusted abx as above  -IVF stopped  -BP meds per renal  -renal US ok  -improving      HTN  -monitor     DM II  Last A1c value was 7.8% done 6/30/2020.  -c

## 2020-07-05 NOTE — PROGRESS NOTES
[facility]    Progress Note    Kingston Conrad Patient Status:  Inpatient    1975 MRN [id number]   Location [facility] 5SW/SE Attending Laurent Rivera MD   Hosp Day # 5 PCP Ling Aponte MD       Subjective:   Elly Huynh no abnormal bruising noted  Back/Spine: no abnormalities noted  Musculoskeletal: full ROM all extremities good strength  no deformities  Extremities: left bka  Neurological:  Grossly normal    Results:     Laboratory Data:  Lab Results   Component Value Da

## 2020-07-06 PROBLEM — E11.22 TYPE 2 DIABETES MELLITUS WITH DIABETIC CHRONIC KIDNEY DISEASE (HCC): Status: ACTIVE | Noted: 2017-09-27

## 2020-07-06 LAB
ANION GAP SERPL CALC-SCNC: 1 MMOL/L (ref 0–18)
BASOPHILS # BLD AUTO: 0.08 X10(3) UL (ref 0–0.2)
BASOPHILS NFR BLD AUTO: 0.6 %
BUN BLD-MCNC: 22 MG/DL (ref 7–18)
BUN/CREAT SERPL: 14.4 (ref 10–20)
CALCIUM BLD-MCNC: 8.8 MG/DL (ref 8.5–10.1)
CHLORIDE SERPL-SCNC: 103 MMOL/L (ref 98–112)
CO2 SERPL-SCNC: 38 MMOL/L (ref 21–32)
CREAT BLD-MCNC: 1.53 MG/DL (ref 0.7–1.3)
DEPRECATED RDW RBC AUTO: 42 FL (ref 35.1–46.3)
EOSINOPHIL # BLD AUTO: 0.65 X10(3) UL (ref 0–0.7)
EOSINOPHIL NFR BLD AUTO: 5.3 %
ERYTHROCYTE [DISTWIDTH] IN BLOOD BY AUTOMATED COUNT: 13.4 % (ref 11–15)
GLUCOSE BLD-MCNC: 136 MG/DL (ref 70–99)
GLUCOSE BLDC GLUCOMTR-MCNC: 132 MG/DL (ref 70–99)
GLUCOSE BLDC GLUCOMTR-MCNC: 154 MG/DL (ref 70–99)
GLUCOSE BLDC GLUCOMTR-MCNC: 158 MG/DL (ref 70–99)
GLUCOSE BLDC GLUCOMTR-MCNC: 192 MG/DL (ref 70–99)
HCT VFR BLD AUTO: 26 % (ref 39–53)
HGB BLD-MCNC: 8 G/DL (ref 13–17.5)
IMM GRANULOCYTES # BLD AUTO: 0.05 X10(3) UL (ref 0–1)
IMM GRANULOCYTES NFR BLD: 0.4 %
LYMPHOCYTES # BLD AUTO: 2.03 X10(3) UL (ref 1–4)
LYMPHOCYTES NFR BLD AUTO: 16.5 %
MCH RBC QN AUTO: 26.7 PG (ref 26–34)
MCHC RBC AUTO-ENTMCNC: 30.8 G/DL (ref 31–37)
MCV RBC AUTO: 86.7 FL (ref 80–100)
MONOCYTES # BLD AUTO: 0.81 X10(3) UL (ref 0.1–1)
MONOCYTES NFR BLD AUTO: 6.6 %
NEUTROPHILS # BLD AUTO: 8.7 X10 (3) UL (ref 1.5–7.7)
NEUTROPHILS # BLD AUTO: 8.7 X10(3) UL (ref 1.5–7.7)
NEUTROPHILS NFR BLD AUTO: 70.6 %
OSMOLALITY SERPL CALC.SUM OF ELEC: 299 MOSM/KG (ref 275–295)
PLATELET # BLD AUTO: 370 10(3)UL (ref 150–450)
POTASSIUM SERPL-SCNC: 4 MMOL/L (ref 3.5–5.1)
RBC # BLD AUTO: 3 X10(6)UL (ref 4.3–5.7)
SODIUM SERPL-SCNC: 142 MMOL/L (ref 136–145)
WBC # BLD AUTO: 12.3 X10(3) UL (ref 4–11)

## 2020-07-06 PROCEDURE — 99232 SBSQ HOSP IP/OBS MODERATE 35: CPT | Performed by: INTERNAL MEDICINE

## 2020-07-06 PROCEDURE — 99232 SBSQ HOSP IP/OBS MODERATE 35: CPT | Performed by: NURSE PRACTITIONER

## 2020-07-06 NOTE — PROGRESS NOTES
[facility]    Progress Note    Meaghan Brower Patient Status:  Inpatient    1975 MRN [id number]   Location [facility] 5SW/SE Attending Nitesh Wong MD   Hosp Day # 6 PCP Ling Harris MD        Subjective:     Cons Q6PRN for pain  - Norco 5-325 Q4PRN for pain  - stump  ordered, nurse called to follow up  - ID, vascular and podiatry following     Acute kidney injury  - Cr trending down.    - adjusted abx as above, stopped  - IVF stopped  - BP meds per renal  -

## 2020-07-06 NOTE — PROGRESS NOTES
[facility] - [facility]  Nephrology Daily Progress Note    Nadege Oglesby  [id number]  39year old      HPI:   Nadege Oglesby is a 39year old male. Up in chair. Feeling OK. No significant pain. Eating OK.        ROS:     Constitutional:  Negat Date    WBC 12.3 07/06/2020    HGB 8.0 07/06/2020    HCT 26.0 07/06/2020    .0 07/06/2020    CREATSERUM 1.53 07/06/2020    BUN 22 07/06/2020     07/06/2020    K 4.0 07/06/2020     07/06/2020    CO2 38.0 07/06/2020     07/06/2020 5,000 Units, Subcutaneous, 2 times per day  •  acetaminophen (TYLENOL) tab 650 mg, 650 mg, Oral, Q6H PRN  •  acetaminophen (TYLENOL) tab 650 mg, 650 mg, Oral, Q4H PRN **OR** HYDROcodone-acetaminophen (NORCO) 5-325 MG per tab 1 tablet, 1 tablet, Oral, Q4H P ([address]      BP good. UO OK. Creatinine better at 1.53. Potassium 4.0. CO2 38? Has been having some vomiting. Repeat in am.   If it persists ABG. Otherwise doing well. To rehab soon.   Discussed with RN.              7/6/2020  Melva Villagran MD

## 2020-07-06 NOTE — PROGRESS NOTES
[facility]OS[facility] - [facility]     Vascular Surgery Progress Note    Nadege Pair Patient Status:  Inpatient    1975 MRN [id number]   Location [facility] 5SW/SE Attending Sridhar Chapa MD   Hosp Day # 6 PCP MD Sergio Simpson 650 mg, 650 mg, Oral, Q4H PRN    Or  HYDROcodone-acetaminophen (NORCO) 5-325 MG per tab 1 tablet, 1 tablet, Oral, Q4H PRN    Or  HYDROcodone-acetaminophen (NORCO) 5-325 MG per tab 2 tablet, 2 tablet, Oral, Q4H PRN  morphINE sulfate (PF) 2 MG/ML injection 1 PM

## 2020-07-06 NOTE — PHYSICAL THERAPY NOTE
PHYSICAL THERAPY TREATMENT NOTE - INPATIENT     Room Number: 526/526-A       Presenting Problem: s/p L BKA    Problem List  Principal Problem:    Diabetic foot infection ([facility] [address]  Active Problems:    Type 2 diabetes mellitus with diabetic chronic kidney disea Techniques: Relaxation;Breathing techniques; Activity promotion;Repositioning(pre medicated by RN. )    BALANCE                                                                                                                     Static Sitting: Good  Dynamic walker CGA with chair follow   Goal #4 Patient will negotiate 2 stairs/one curb w/ assistive device and supervision   Goal #4   Current Status Not tested    Goal #5 Patient to demonstrate independence with home activity/exercise instructions provided to pa

## 2020-07-06 NOTE — PAYOR COMM NOTE
--------------  CONTINUED STAY REVIEW    Payor: Jan Shannon  #:  [id number]  Authorization Number: [id number]    Admit date: 6/30/20  Admit time: 200    Admitting Physician: Corbin Gaxiola MD  Attending Physician:  Cora Ash point ROS was asked and was negative     Objective:      Vitals     07/04/20  1637 07/04/20  2143 07/05/20  0549 07/05/20  1008   BP: 121/68 135/65 124/65 122/62   BP Location: Right arm Right arm Right arm Right arm   Pulse: 112 99 95 99   Resp: 18 18 18 abscess   Acute and chronic osteomyelitis  -stopped IV zosyn and started cefepime  -cont IV vanco   -s/p BKA 7/2, path pending   -ID, vascular and podiatry following  -PMR consult appreciated - plan 2 weeks acute rehab, SW for arrangements   -gabapentin fo cervical, supraclavicular adenopathy is noted  Respiratory:  lungs are clear to auscultation bilaterally, normal respiratory effort  Cardiovascular: regular rate and rhythm no murmurs, gallups, or rubs  Abdomen: soft, non-tender, non-distended, BS normal oriented to person, place, and time and obese. He appears well-developed and well-nourished. HENT:   Head: Normocephalic. Eyes: Pupils are equal, round, and reactive to light. Cardiovascular: Normal rate and regular rhythm.     Pulmonary/Chest: Effort 38.0 (H) 07/06/2020      (H) 07/06/2020     CA 8.8 07/06/2020 7/6 NEPHROLOGY NOTE     HPI:   Crissy Agee is a 39year old male. Up in chair. Feeling OK. No significant pain. Eating OK.             ASSESSMENT/PLAN:   Assessment   Patient

## 2020-07-06 NOTE — PLAN OF CARE
Pt is a/o x4. VSS. Denies pain at this time. Pt requested dinner late and required 1 unit of insulin. Voiding per urinal. NWB to left foot. Bed alarm on. Bed locked in lowest position. Call light in reach.    Problem: Diabetes/Glucose Control  Goal: Glucose of infection  Description  INTERVENTIONS:  - Assess and document risk factors for pressure ulcer development  - Assess and document skin integrity  - Assess and document dressing/incision, wound bed, drain sites and surrounding tissue  - Implement wound ca

## 2020-07-06 NOTE — OCCUPATIONAL THERAPY NOTE
OCCUPATIONAL THERAPY TREATMENT NOTE - INPATIENT        Room Number: 526/526-A                Problem List  Principal Problem:    Diabetic foot infection ([facility] [address]  Active Problems:    Type 2 diabetes mellitus with diabetic chronic kidney disease ([facility]    Osteo ASSESSMENT  Ratin(initial activity; 0/10 once up)  Location: LLE  Management Techniques:  Activity promotion;Repositioning          ACTIVITIES OF DAILY LIVING ASSESSMENT  AM-PAC ‘6-Clicks’ Inpatient Daily Activity Short Form  How much help from another

## 2020-07-07 VITALS
HEIGHT: 74 IN | WEIGHT: 240.5 LBS | HEART RATE: 99 BPM | SYSTOLIC BLOOD PRESSURE: 124 MMHG | TEMPERATURE: 98 F | OXYGEN SATURATION: 96 % | DIASTOLIC BLOOD PRESSURE: 63 MMHG | RESPIRATION RATE: 18 BRPM | BODY MASS INDEX: 30.87 KG/M2

## 2020-07-07 LAB
ALBUMIN SERPL-MCNC: 2.3 G/DL (ref 3.4–5)
ANION GAP SERPL CALC-SCNC: 2 MMOL/L (ref 0–18)
BASOPHILS # BLD AUTO: 0.1 X10(3) UL (ref 0–0.2)
BASOPHILS NFR BLD AUTO: 0.8 %
BUN BLD-MCNC: 24 MG/DL (ref 7–18)
BUN/CREAT SERPL: 16.7 (ref 10–20)
CALCIUM BLD-MCNC: 8.7 MG/DL (ref 8.5–10.1)
CHLORIDE SERPL-SCNC: 102 MMOL/L (ref 98–112)
CO2 SERPL-SCNC: 37 MMOL/L (ref 21–32)
CREAT BLD-MCNC: 1.44 MG/DL (ref 0.7–1.3)
DEPRECATED RDW RBC AUTO: 42.1 FL (ref 35.1–46.3)
EOSINOPHIL # BLD AUTO: 0.73 X10(3) UL (ref 0–0.7)
EOSINOPHIL NFR BLD AUTO: 5.9 %
ERYTHROCYTE [DISTWIDTH] IN BLOOD BY AUTOMATED COUNT: 13.6 % (ref 11–15)
GLUCOSE BLD-MCNC: 119 MG/DL (ref 70–99)
GLUCOSE BLDC GLUCOMTR-MCNC: 104 MG/DL (ref 70–99)
GLUCOSE BLDC GLUCOMTR-MCNC: 161 MG/DL (ref 70–99)
HAV IGM SER QL: 2.4 MG/DL (ref 1.6–2.6)
HCT VFR BLD AUTO: 25.6 % (ref 39–53)
HGB BLD-MCNC: 7.8 G/DL (ref 13–17.5)
IMM GRANULOCYTES # BLD AUTO: 0.06 X10(3) UL (ref 0–1)
IMM GRANULOCYTES NFR BLD: 0.5 %
LYMPHOCYTES # BLD AUTO: 2.68 X10(3) UL (ref 1–4)
LYMPHOCYTES NFR BLD AUTO: 21.8 %
MCH RBC QN AUTO: 26.4 PG (ref 26–34)
MCHC RBC AUTO-ENTMCNC: 30.5 G/DL (ref 31–37)
MCV RBC AUTO: 86.8 FL (ref 80–100)
MONOCYTES # BLD AUTO: 0.7 X10(3) UL (ref 0.1–1)
MONOCYTES NFR BLD AUTO: 5.7 %
NEUTROPHILS # BLD AUTO: 8.05 X10 (3) UL (ref 1.5–7.7)
NEUTROPHILS # BLD AUTO: 8.05 X10(3) UL (ref 1.5–7.7)
NEUTROPHILS NFR BLD AUTO: 65.3 %
OSMOLALITY SERPL CALC.SUM OF ELEC: 297 MOSM/KG (ref 275–295)
PHOSPHATE SERPL-MCNC: 3.6 MG/DL (ref 2.5–4.9)
PLATELET # BLD AUTO: 374 10(3)UL (ref 150–450)
POTASSIUM SERPL-SCNC: 3.8 MMOL/L (ref 3.5–5.1)
RBC # BLD AUTO: 2.95 X10(6)UL (ref 4.3–5.7)
SODIUM SERPL-SCNC: 141 MMOL/L (ref 136–145)
WBC # BLD AUTO: 12.3 X10(3) UL (ref 4–11)

## 2020-07-07 PROCEDURE — 99232 SBSQ HOSP IP/OBS MODERATE 35: CPT | Performed by: INTERNAL MEDICINE

## 2020-07-07 PROCEDURE — 99239 HOSP IP/OBS DSCHRG MGMT >30: CPT | Performed by: HOSPITALIST

## 2020-07-07 RX ORDER — POTASSIUM CHLORIDE 1.5 G/1.77G
40 POWDER, FOR SOLUTION ORAL ONCE
Status: COMPLETED | OUTPATIENT
Start: 2020-07-07 | End: 2020-07-07

## 2020-07-07 RX ORDER — HYDROCODONE BITARTRATE AND ACETAMINOPHEN 5; 325 MG/1; MG/1
1 TABLET ORAL EVERY 4 HOURS PRN
Qty: 20 TABLET | Refills: 0 | Status: SHIPPED | OUTPATIENT
Start: 2020-07-07

## 2020-07-07 RX ORDER — PSEUDOEPHEDRINE HCL 30 MG
100 TABLET ORAL 2 TIMES DAILY
Qty: 30 CAPSULE | Refills: 0 | Status: SHIPPED | OUTPATIENT
Start: 2020-07-07

## 2020-07-07 RX ORDER — GABAPENTIN 100 MG/1
100 CAPSULE ORAL 3 TIMES DAILY
Qty: 90 CAPSULE | Refills: 0 | Status: SHIPPED | OUTPATIENT
Start: 2020-07-07

## 2020-07-07 RX ORDER — POLYETHYLENE GLYCOL 3350 17 G/17G
17 POWDER, FOR SOLUTION ORAL DAILY
Qty: 30 EACH | Refills: 0 | Status: SHIPPED | OUTPATIENT
Start: 2020-07-07

## 2020-07-07 RX ORDER — ACETAMINOPHEN 325 MG/1
650 TABLET ORAL EVERY 6 HOURS PRN
Qty: 30 TABLET | Refills: 0 | Status: SHIPPED | OUTPATIENT
Start: 2020-07-07

## 2020-07-07 RX ORDER — AMLODIPINE BESYLATE 5 MG/1
5 TABLET ORAL DAILY
Qty: 30 TABLET | Refills: 0 | Status: SHIPPED | OUTPATIENT
Start: 2020-07-07

## 2020-07-07 NOTE — PROGRESS NOTES
[facility] HOSP - [facility]  Nephrology Daily Progress Note    Zoraida Bose  [id number]  39year old      HPI:   Zoraida Bose is a 39year old male. Continues to do well. No further emesis. Pain manageable. Eating well.        ROS:     Nayeli for age    Labs:  Lab Results   Component Value Date    WBC 12.3 07/07/2020    HGB 7.8 07/07/2020    HCT 25.6 07/07/2020    .0 07/07/2020    CREATSERUM 1.44 07/07/2020    BUN 24 07/07/2020     07/07/2020    K 3.8 07/07/2020     07/07/202 4 mg, Intravenous, Q2H PRN  •  Insulin Aspart Pen (NOVOLOG) 100 UNIT/ML flexpen 1-11 Units, 1-11 Units, Subcutaneous, TID CC  •  Normal Saline Flush 0.9 % injection 3 mL, 3 mL, Intravenous, PRN  •  Heparin Sodium (Porcine) 5000 UNIT/ML injection 5,000 Unit extremity, left, with unspecified severity ([address]     Uncontrolled type 2 diabetes mellitus with hyperglycemia ([address]     Diabetic foot infection ([address]     Osteomyelitis of left foot, unspecified type ([address]     BRAD (acute kidney injury) ([address]     Constipatio

## 2020-07-07 NOTE — CM/SW NOTE
07/07/20 1057   Discharge disposition   Expected discharge disposition Short Term H   Name of Facillity/Home Care/Hospice [facility]   Patient is Discharged to a [address] [place] [address] Yes   Discharge transportation [facility]  (1:00 per [employer])

## 2020-07-07 NOTE — PAYOR COMM NOTE
--------------  DISCHARGE REVIEW    Payor: Jan Shannon  #:  [id number]  Authorization Number: [id number]    Admit date: 6/30/20  Admit time:  1531  Discharge Date: 7/7/2020  1:43 PM     Admitting Physician: Ratna Chen MD  Attendi metatarsals as well as soft tissue that would be at the level of the third toe, suggesting gas-producing organisms.   Multiple bony abnormalities identified with a comminuted, subacute-appearing fracture of the mid shaft of the second metatarsal and more ac formation and necrosis. 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   · Skin and subcutaneous tissue from the dorsal and lateral aspect of the foot with surface ulceration, severe acute and chronic inflammation, and necro the left foot as discussed above with significant soft tissue swelling at the 1st and 2nd toes and to a lesser degree the 4th toe. There is marked soft tissue swelling of the dorsal aspect of the midfoot.   Air density noted between the 1st and 2nd metatar lateral-dorsal aspect of the wound, and it likely communicates with the aforementioned fluid collection and it measures 11 x 2 x 2 cm. These fluid collections are most compatible with abscesses.  2.  There are fracture deformities involving the 2nd-4th met 2.2*  --   --   --  2.3*    139   < > 142 142 141   K 3.8 4.7   < > 4.0 4.0 3.8    105   < > 104 103 102   CO2 31.0 31.0   < > 33.0* 38.0* 37.0*   ALKPHO 96  --   --   --   --   --    AST 9*  --   --   --   --   --    ALT 9*  --   --   --   -- for blood glucose 281-300 mg/dL  Give 9 units for blood glucose 301-320 mg/dL  Give 10 units for blood glucose 321-340 mg/dL  Give 11 units for blood glucose 341-360 mg/dL  Call physician if blood glucose is greater than 360 mg/dL   Refills:  0     farrah discharge from rehab    MD Tam Sarkar [phone]    Schedule an appointment as soon as possible for a visit  after discharge from rehab    Caron Tavares MD    [address]

## 2020-07-07 NOTE — DISCHARGE SUMMARY
[facility]    Discharge Summary    Soham Knight Patient Status:  Inpatient    1975 MRN [id number]   Location [facility] 5SW/SE Attending Franklyn Morrissey MD   Hosp Day # 7 PCP Ling Harris MD     Date of Admission: suggestive of osteomyelitis; more chronic-appearing bone destruction involving the distal aspect of the left first metatarsal as well as the proximal distal phalanx of the great toe, suggesting more chronic osteomyelitis changes.   MRI of the foot still pen AEROBIC BACTERIAL CULTURE     Status: Abnormal    Collection Time: 06/30/20 12:30 PM   Result Value Ref Range    Aerobic Culture Result 2+ growth Proteus mirabilis (A) N/A    Aerobic Culture Result 2+ growth Staphylococcus aureus (A) N/A    Aerobic Culture and 4th metatarsals and at the proximal phalanx of the left 4th toe could suggest healing stress fractures or subacute periosteal response to underlying infection.   Heterogeneous bone density identified at the left 2nd and 3rd metatarsal suspect for osteom Bobbi Burnham MD on 6/30/2020 at 8:31 PM            LABS :     Lab Results   Component Value Date    WBC 12.3 (H) 07/07/2020    HGB 7.8 (L) 07/07/2020    HCT 25.6 (L) 07/07/2020    .0 07/07/2020    CREATSERUM 1.44 (H) 07/07/2020    BUN 24 (H) 07/07/2020 needed. Quantity:  30 tablet  Refills:  0     amLODIPine Besylate 5 MG Tabs  Commonly known as:  NORVASC      Take 1 tablet (5 mg total) by mouth daily.    Quantity:  30 tablet  Refills:  0     docusate sodium 100 MG Caps  Commonly known as:  Heyward Shone known as:  LANTUS  What changed:  how much to take      Inject 10 Units into the skin nightly.    Quantity:  10 mL  Refills:  0        CONTINUE taking these medications      Instructions Prescription details   lisinopril 10 MG Tabs      Take 1 tablet (10 mg Physician  PODIATRIST                Other Discharge Instructions:       ----------------------------------------------------  >30 MIN SPENT ON THIS DC   CHASE BOND  7/7/2020  11:12 AM

## 2020-07-07 NOTE — PLAN OF CARE
Problem: Diabetes/Glucose Control  Goal: Glucose maintained within prescribed range  Description  INTERVENTIONS:  - Monitor Blood Glucose as ordered  - Assess for signs and symptoms of hyperglycemia and hypoglycemia  - Administer ordered medications to m document dressing/incision, wound bed, drain sites and surrounding tissue  - Implement wound care per orders  - Initiate isolation precautions as appropriate  - Initiate Pressure Ulcer prevention bundle as indicated  Outcome: Adequate for Discharge     Pro

## 2021-03-17 DIAGNOSIS — Z23 NEED FOR VACCINATION: ICD-10-CM

## (undated) DEVICE — Device

## (undated) DEVICE — LOWER EXTREMITY: Brand: MEDLINE INDUSTRIES, INC.

## (undated) DEVICE — GAMMEX® PI HYBRID SIZE 6.5, STERILE POWDER-FREE SURGICAL GLOVE, POLYISOPRENE AND NEOPRENE BLEND: Brand: GAMMEX

## (undated) DEVICE — PROXIMATE RH ROTATING HEAD SKIN STAPLERS (35 WIDE) CONTAINS 35 STAINLESS STEEL STAPLES: Brand: PROXIMATE

## (undated) DEVICE — ABDOMINAL PAD: Brand: CURITY

## (undated) DEVICE — SUTURE SILK 3-0 C017D

## (undated) DEVICE — SUTURE SILK 3-0 SA64H

## (undated) DEVICE — TRAY SKIN PREP PVP-1

## (undated) DEVICE — SUTURE VICRYL 2-0 CT-1

## (undated) DEVICE — BATTERY

## (undated) DEVICE — SHEET,DRAPE,70X100,STERILE: Brand: MEDLINE

## (undated) DEVICE — SUTURE SILK 2-0 SA85H

## (undated) DEVICE — HEMOCLIP HORIZON MED 002200

## (undated) DEVICE — SUTURE SILK 2-0 SH

## (undated) DEVICE — SUTURE SILK 3-0 SH

## (undated) DEVICE — KNEE IMMOBILIZER: Brand: DEROYAL

## (undated) DEVICE — STERILE TETRA-FLEX CF, ELASTIC BANDAGE, 4" X 5.5YD: Brand: TETRA-FLEX™CF

## (undated) DEVICE — SUPER SPONGES,MEDIUM: Brand: KERLIX

## (undated) DEVICE — SUCTION CANISTER, 3000CC,SAFELINER: Brand: DEROYAL

## (undated) DEVICE — SPONGE LAP 18X18 XRAY STRL

## (undated) DEVICE — TOWEL OR BLU 16X26 STRL

## (undated) DEVICE — CLIP SM INTNL HMCLP TI ESCP

## (undated) DEVICE — SOL  .9 1000ML BTL

## (undated) DEVICE — SUTURE VICRYL 3-0 SH

## (undated) DEVICE — INTENDED FOR TISSUE SEPARATION, AND OTHER PROCEDURES THAT REQUIRE A SHARP SURGICAL BLADE TO PUNCTURE OR CUT.: Brand: BARD-PARKER ® STAINLESS STEEL BLADES

## (undated) DEVICE — GAMMEX® PI HYBRID SIZE 6, STERILE POWDER-FREE SURGICAL GLOVE, POLYISOPRENE AND NEOPRENE BLEND: Brand: GAMMEX

## (undated) DEVICE — HEMOCLIP MED 24 CLIP/CARTRIDGE

## (undated) DEVICE — ABSORBABLE HEMOSTAT (OXIDIZED REGENERATED CELLULOSE, U.S.P.): Brand: SURGICEL

## (undated) DEVICE — OCCLUSIVE GAUZE STRIP,3% BISMUTH TRIBROMOPHENATE IN PETROLATUM BLEND: Brand: XEROFORM

## (undated) NOTE — LETTER
1/23/2019          To Whom It May Concern:      Tomas Gallardo is currently under my medical care.     Cherie Clark may return to work under sedentary duty.     No heavy lifting , over 15 to 20 pounds for the next 4 weeks .     If you require additional info

## (undated) NOTE — LETTER
1/22/2019          To Whom It May Concern:    Samuel Koch is currently under my medical care. Coy Cole may return to work under sedentary duty. No heavy lifting , over 15 to 20 pounds.     If you require additional information please contact our

## (undated) NOTE — LETTER
Hospital Discharge Documentation  Please phone to schedule a hospital follow up appointment.     From: [address] Hospitalist's Office  Phone: [phone]    Patient discharged time/date: 7/7/2020  1:43 PM  Patient discharge disposition:  Inpt Physical as well as soft tissue that would be at the level of the third toe, suggesting gas-producing organisms.   Multiple bony abnormalities identified with a comminuted, subacute-appearing fracture of the mid shaft of the second metatarsal and more acute, mildly chronic inflammation, granulation tissue formation and necrosis. 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   · Skin and subcutaneous tissue from the dorsal and lateral aspect of the foot with surface ulceration, severe CONCLUSION:  1. Marked multifocal abnormalities are noted in the left foot as discussed above with significant soft tissue swelling at the 1st and 2nd toes and to a lesser degree the 4th toe.   There is marked soft tissue swelling of the dorsal aspect of th 3rd predominantly linear/tubular shaped fluid collection also extends from the lateral-dorsal aspect of the wound, and it likely communicates with the aforementioned fluid collection and it measures 11 x 2 x 2 cm.   These fluid collections are most compatib GFRAA 65 67   < > 57* 63 67   GFRNAA 56* 58*   < > 49* 54* 58*   CA 9.0 8.7   < > 8.9 8.8 8.7   ALB 2.3* 2.2*  --   --   --  2.3*    139   < > 142 142 141   K 3.8 4.7   < > 4.0 4.0 3.8    105   < > 104 103 102   CO2 31.0 31.0   < > 33.0* 38.0* Give 6 units for blood glucose 241-260 mg/dL  Give 7 units for blood glucose 261-280 mg/dL  Give 8 units for blood glucose 281-300 mg/dL  Give 9 units for blood glucose 301-320 mg/dL  Give 10 units for blood glucose 321-340 mg/dL  Give 11 units for blood g Doctor Soto [facility]. [address]  [phone]    Schedule an appointment as soon as possible for a visit  after discharge from rehab    MD Tam Adams [id number] [phone]    Schedule an appointment as so

## (undated) NOTE — LETTER
04/13/18        [address] [facility]      Dear Mateus Shah records indicate that you have outstanding lab work and or testing that was ordered for you and has not yet been completed:          ALT(SGPT) [E]      AST (SGOT

## (undated) NOTE — LETTER
2/25/2019          To Whom It May Concern:    Gregory Staples is currently under my medical care and may return to work on March 4, 2019. Activity is restricted as follows: none. If you require additional information please contact our office.

## (undated) NOTE — LETTER
03/13/18        [facility]      Dear Ashish Kidney,    Our records indicate that you have outstanding lab work and or testing that was ordered for you and has not yet been completed:          ALT(SGPT) [E]      AST (SGOT

## (undated) NOTE — IP AVS SNAPSHOT
Patient Demographics     Address  [address] [place] [id number] Phone  [phone] [facility])  [phone] (Mobile) *Preferred* E-mail Address  [email or web address]      Emergency Contact(s)     Name Relation Home Work [address] [place] [address] Commonly known as:  NEURONTIN  Next dose due: Tonight 9 pm      Take 1 capsule (100 mg total) by mouth 3 (three) times daily.    Jameson Ards, APN         HYDROcodone-acetaminophen 5-325 MG Tabs  Commonly known as:  [address]  Next dose due:  Anytime as [place] These medications were sent to [place] [address] [address], [address] [facility], [phone], [address] [facility] [address]    Phone:  [phone]   acetaminophen 325 MG Tabs  amLODIPine Besyl [id number] Insulin Aspart Pen (NOVOLOG) 100 UNIT/ML flexpen 1-11 Units 07/07/20 1000 Given              Recent Vital Signs       Most Recent Value   Vitals  124/63 Filed at 07/07/2020 0856   Pulse  99 Filed at 07/07/2020 0856   Resp  18 Filed at 07/07/2020 GFR, -American 67 >=60 — [facility])   Albumin 2.3 3.4 - 5.0 g/dL  [facility])   Phosphorus 3.6 2.5 - 4.9 mg/dL [facility])            CBC WITH DIFFERENTIAL WITH PLATELET [[id number]]  Resulted: 07/07/20 0702, Result status: Fi Trimethoprim/Sulfa <=20  Sensitive               Susceptibility      Staphylococcus aureus     Not Specified    Cefazolin  Sensitive    Clindamycin <=0.25  Sensitive    Erythromycin <=0.25  Sensitive    Gentamicin 8  Intermediate    Levofloxacin 0.25  Griggs Floro the mid foot. Air density noted between the first and second metatarsals as well as soft tissue that would be at the level of the third toe, suggesting gas-producing organisms.   Multiple bony abnormalities identified with a comminuted, subacute-appearing Other 12-point review of systems is negative. PHYSICAL EXAMINATION:    GENERAL:  Alert and oriented to time, place, and person. Moderate distress.     VITAL SIGNS:  Temperature 97.9, pulse 115, respiratory rate 18, blood pressure 157/96, pulse ox 99% Electronically signed by Kate Diaz MD on 6/30/2020  6:24 PM   Attribution Key    FB. 1 - Kate Diaz MD on 6/30/2020  1:49 PM                        Consults - MD Consult Notes      Consults signed by Dasia Castillo MD at 7/3/2020  3:07 PM     Au SK.1 Tawny Chao MD on 7/3/2020  3:01 PM                        Discharge Summary - D/C Summary      Discharge Summary signed by Charmayne Cranker, MD at 7/7/2020 11:16 AM  Version 1 of 1    Author:  Charmayne Cranker, MD Service:  Hospitalist Author T of the second metatarsal and more acute, mildly displaced fractures of the proximal aspects of the second and third metatarsals.   Subacute to chronic-appearing periosteal reaction of the left second, third, and fourth metatarsals and the proximal phalanx o foot with surface ulceration, severe acute and chronic inflammation, and necrosis. · Bone underneath the ulcerated area in the dorsal and lateral aspect of the foot with severe acute osteomyelitis.   · Anterior and posterior tibial artery and dorsalis pedi swelling of the dorsal aspect of the midfoot. Air density noted between the 1st and 2nd metatarsals, as well as the soft tissue that would be at the level of the left 3rd toe suggestive of gas producing organisms.  2. Multiple bony abnormalities identified fluid collections are most compatible with abscesses. 2.  There are fracture deformities involving the 2nd-4th metatarsals. The 4th metatarsal fracture may be chronic. The head of the 3rd metatarsal has cortical loss.   There are findings compatible with CO2 31.0 31.0   < > 33.0* 38.0* 37.0*   ALKPHO 96  --   --   --   --   --    AST 9*  --   --   --   --   --    ALT 9*  --   --   --   --   --    BILT 0.2  --   --   --   --   --    TP 8.5*  --   --   --   --   --     < > = values in this interval not displ Give 10 units for blood glucose 321-340 mg/dL  Give 11 units for blood glucose 341-360 mg/dL  Call physician if blood glucose is greater than 360 mg/dL   Refills:  0     magnesium hydroxide 400 MG/5ML Susp  Commonly known as:  MILK OF MAGNESIA      Take 30 [address] [facility] [address] [phone]    Schedule an appointment as soon as possible for a visit  after discharge from rehab    Vianney Bolivar MD  [facility] [address] [place] [address] [phone]    In 3 weeks  For suture removal, For wound and session coordinated with OT, gait belt donned. Patient also with boot donned. PT wore mask and gloves and patient wore mask in hallway. Patient currently with SBA for bed mobility with head of bed elevated.  Able to sit to/from stand with rolling walker -   Turning over in bed (including adjusting bedclothes, sheets and blankets)?: A Little   -   Sitting down on and standing up from a chair with arms (e.g., wheelchair, bedside commode, etc.): A Little   -   Moving from lying on back to sitting on the side JG.1 Krystal Damon, PT on 7/6/2020  2:32 PM  JG. 2 - Gianni Cordero, PT on 7/6/2020  2:46 PM                        Occupational Therapy Notes (last 72 hours) (Notes from 7/4/2020 11:58 AM through 7/7/2020 11:58 AM)      Occupational Therapy Note OT Discharge Recommendations: Acute rehabilitation  OT Device Recommendations: TBD[AA.2]     PLAN[AA.1]  OT Treatment Plan: Balance activities; Energy conservation/work simplification techniques;ADL training;Functional transfer training; Endurance training;P OT Goals  Patients self stated goal is: to return back to home     Patient will complete functional transfer with SBA   Comment: progressing    Patient will complete toileting with SBA   Comment:  progressing    Patient will tolerate standing for 3-5 minut cooperative and agreeable to all tasks presented; patient required Mod A for transition from supine to EOB; increased pain from 0/10 to 8/10 with LLE position changes; initially positioned in elevated position, but patient tolerating bent knee unsupported History reviewed. No pertinent surgical history.     HOME SITUATION  Type of Home: House  Home Layout: Multi-level  Lives With: Family     Toilet and Equipment: Standard height toilet  Shower/Tub and Equipment: Tub-shower combo  Other Equipment: None    Occ Shower Transfer: NT  Chair Transfer: Min A x1 with RW  Bedroom Mobility: Able to take hop steps with Min A x1 with RW <5 feet completed with OT     FUNCTIONAL ADL ASSESSMENT  Grooming: setup  Feeding: Ange   Toileting:  Mod A for clothing management  Upper E

## (undated) NOTE — IP AVS SNAPSHOT
[facility]            (For Outpatient Use Only) Initial Admit Date: 6/30/2020   Inpt/Obs Admit Date: Inpt: 6/30/20 / Obs: N/A   Discharge Date:    Ankit Israel:  [de-identified]   MRN: [de-identified]   CSN: [id number]   CEID: [id number]        XJP Subscriber Name:  Kwame Mcginnis :    Subscriber ID:  Pt Rel to Subscriber:    Hospital Account Financial Class: Managed Care    2020

## (undated) NOTE — LETTER
12/13/17        [address] [facility]      Dear Luz Dang records indicate that you have outstanding lab work and or testing that was ordered for you and has not yet been completed:          ALT(SGPT) [E]      AST (SGOT

## (undated) NOTE — Clinical Note
FYI, patient eligible for TCM until 1/29/2019. Declined appt for now stating that he is following up with specialists. TCM outreach completed.